# Patient Record
Sex: MALE | Race: BLACK OR AFRICAN AMERICAN | Employment: OTHER | URBAN - METROPOLITAN AREA
[De-identification: names, ages, dates, MRNs, and addresses within clinical notes are randomized per-mention and may not be internally consistent; named-entity substitution may affect disease eponyms.]

---

## 2017-01-15 DIAGNOSIS — I10 ESSENTIAL HYPERTENSION WITH GOAL BLOOD PRESSURE LESS THAN 130/80: ICD-10-CM

## 2017-01-16 ENCOUNTER — HOSPITAL ENCOUNTER (OUTPATIENT)
Dept: CARDIAC REHAB | Age: 57
Discharge: HOME | End: 2017-01-16
Payer: COMMERCIAL

## 2017-01-16 VITALS — WEIGHT: 255 LBS | BODY MASS INDEX: 33.64 KG/M2

## 2017-01-16 PROCEDURE — 93798 PHYS/QHP OP CAR RHAB W/ECG: CPT

## 2017-01-16 RX ORDER — HYDROCHLOROTHIAZIDE 25 MG/1
TABLET ORAL
Qty: 30 TAB | Refills: 3 | Status: SHIPPED | OUTPATIENT
Start: 2017-01-16 | End: 2017-05-22 | Stop reason: SDUPTHER

## 2017-01-23 ENCOUNTER — HOSPITAL ENCOUNTER (OUTPATIENT)
Dept: CARDIAC REHAB | Age: 57
Discharge: HOME | End: 2017-01-23
Payer: COMMERCIAL

## 2017-01-23 VITALS — BODY MASS INDEX: 33.51 KG/M2 | WEIGHT: 254 LBS

## 2017-01-23 PROCEDURE — 93798 PHYS/QHP OP CAR RHAB W/ECG: CPT

## 2017-01-24 ENCOUNTER — HOSPITAL ENCOUNTER (OUTPATIENT)
Dept: CARDIAC REHAB | Age: 57
Discharge: HOME | End: 2017-01-24
Payer: COMMERCIAL

## 2017-01-24 VITALS — WEIGHT: 254 LBS | BODY MASS INDEX: 33.51 KG/M2

## 2017-01-24 PROCEDURE — 93798 PHYS/QHP OP CAR RHAB W/ECG: CPT

## 2017-02-01 DIAGNOSIS — I10 ESSENTIAL HYPERTENSION WITH GOAL BLOOD PRESSURE LESS THAN 130/80: ICD-10-CM

## 2017-02-01 RX ORDER — ENALAPRIL MALEATE 20 MG/1
TABLET ORAL
Qty: 60 TAB | Refills: 3 | Status: SHIPPED | OUTPATIENT
Start: 2017-02-01 | End: 2017-06-20 | Stop reason: SDUPTHER

## 2017-02-16 ENCOUNTER — TELEPHONE (OUTPATIENT)
Dept: CARDIAC REHAB | Age: 57
End: 2017-02-16

## 2017-02-16 NOTE — TELEPHONE ENCOUNTER
2/16/2017 Cardiac Wellness: Follow up phone call placed to Mr. Gem Odom following several weeks absence. Mr. Maximo Nolasco stated he has been ill, but will be returning to cardiac rehab on 2/20/2017.  Amanda Farmer RN

## 2017-02-20 ENCOUNTER — HOSPITAL ENCOUNTER (OUTPATIENT)
Dept: CARDIAC REHAB | Age: 57
Discharge: HOME | End: 2017-02-20

## 2017-02-20 VITALS — WEIGHT: 250 LBS | BODY MASS INDEX: 32.98 KG/M2

## 2017-02-21 ENCOUNTER — APPOINTMENT (OUTPATIENT)
Dept: CARDIAC REHAB | Age: 57
End: 2017-02-21

## 2017-02-22 ENCOUNTER — HOSPITAL ENCOUNTER (OUTPATIENT)
Dept: CARDIAC REHAB | Age: 57
Discharge: HOME | End: 2017-02-22

## 2017-02-22 VITALS — WEIGHT: 250 LBS | BODY MASS INDEX: 32.98 KG/M2

## 2017-02-27 ENCOUNTER — HOSPITAL ENCOUNTER (OUTPATIENT)
Dept: CARDIAC REHAB | Age: 57
Discharge: HOME | End: 2017-02-27

## 2017-02-27 VITALS — BODY MASS INDEX: 32.98 KG/M2 | WEIGHT: 250 LBS

## 2017-03-02 ENCOUNTER — HOSPITAL ENCOUNTER (OUTPATIENT)
Dept: CARDIAC REHAB | Age: 57
Discharge: HOME | End: 2017-03-02
Payer: COMMERCIAL

## 2017-03-02 VITALS — WEIGHT: 250 LBS | BODY MASS INDEX: 32.98 KG/M2

## 2017-03-20 ENCOUNTER — HOSPITAL ENCOUNTER (OUTPATIENT)
Dept: CARDIAC REHAB | Age: 57
Discharge: HOME | End: 2017-03-20
Payer: COMMERCIAL

## 2017-03-20 VITALS — BODY MASS INDEX: 32.98 KG/M2 | WEIGHT: 250 LBS

## 2017-03-21 ENCOUNTER — HOSPITAL ENCOUNTER (OUTPATIENT)
Dept: CARDIAC REHAB | Age: 57
Discharge: HOME | End: 2017-03-21
Payer: COMMERCIAL

## 2017-03-21 VITALS — WEIGHT: 250 LBS | BODY MASS INDEX: 32.98 KG/M2

## 2017-03-22 ENCOUNTER — HOSPITAL ENCOUNTER (OUTPATIENT)
Dept: CARDIAC REHAB | Age: 57
Discharge: HOME | End: 2017-03-22
Payer: COMMERCIAL

## 2017-03-22 VITALS — WEIGHT: 250 LBS | BODY MASS INDEX: 32.98 KG/M2

## 2017-03-22 PROCEDURE — 93798 PHYS/QHP OP CAR RHAB W/ECG: CPT

## 2017-03-27 ENCOUNTER — APPOINTMENT (OUTPATIENT)
Dept: CARDIAC REHAB | Age: 57
End: 2017-03-27
Payer: COMMERCIAL

## 2017-05-02 ENCOUNTER — OFFICE VISIT (OUTPATIENT)
Dept: CARDIOLOGY CLINIC | Age: 57
End: 2017-05-02

## 2017-05-02 VITALS
RESPIRATION RATE: 16 BRPM | HEIGHT: 73 IN | BODY MASS INDEX: 33.53 KG/M2 | OXYGEN SATURATION: 98 % | WEIGHT: 253 LBS | SYSTOLIC BLOOD PRESSURE: 120 MMHG | DIASTOLIC BLOOD PRESSURE: 78 MMHG | HEART RATE: 68 BPM

## 2017-05-02 DIAGNOSIS — I10 ESSENTIAL HYPERTENSION: ICD-10-CM

## 2017-05-02 DIAGNOSIS — E78.5 HYPERLIPIDEMIA, UNSPECIFIED HYPERLIPIDEMIA TYPE: ICD-10-CM

## 2017-05-02 DIAGNOSIS — Z95.5 PRESENCE OF BARE METAL STENT IN LAD CORONARY ARTERY: ICD-10-CM

## 2017-05-02 DIAGNOSIS — I25.118 CORONARY ARTERY DISEASE OF NATIVE ARTERY OF NATIVE HEART WITH STABLE ANGINA PECTORIS (HCC): Primary | ICD-10-CM

## 2017-05-02 NOTE — MR AVS SNAPSHOT
Visit Information Date & Time Provider Department Dept. Phone Encounter #  
 5/2/2017  8:40 AM Jigar Clements MD [facility] [phone] [id number] Upcoming Health Maintenance Date Due Hepatitis C Screening 1960 Pneumococcal 19-64 Medium Risk (1 of 1 - PPSV23) 9/17/1981 FOBT Q 1 YEAR AGE 50-75 9/17/2012 INFLUENZA AGE 9 TO ADULT 8/1/2017 DTaP/Tdap/Td series (2 - Td) 1/13/2022 Allergies as of 5/2/2017  Review Complete On: 5/2/2017 By: Raymundo Odell LPN Severity Noted Reaction Type Reactions Nifedipine  01/13/2012   Side Effect Other (comments) Caused terrible feeling/not himself/drowsey Quinine  04/17/2009    Itching Current Immunizations  Reviewed on 5/29/2014 Name Date TDAP Vaccine 1/13/2012 Not reviewed this visit Vitals BP Pulse Resp Height(growth percentile) Weight(growth percentile) SpO2  
 120/78 (BP 1 Location: Left arm, BP Patient Position: Sitting) 68 16 6' 1\" (1.854 m) 253 lb (114.8 kg) 98% BMI Smoking Status 33.38 kg/m2 Light Tobacco Smoker Vitals History BMI and BSA Data Body Mass Index Body Surface Area  
 33.38 kg/m 2 2.43 m 2 Preferred Pharmacy Pharmacy Name Phone [id number] [place], [address] [address] Your Updated Medication List  
  
   
This list is accurate as of: 5/2/17  8:53 AM.  Always use your most recent med list.  
  
  
  
  
 ALEVE 220 mg tablet Generic drug:  naproxen sodium Take 220 mg by mouth as needed. amLODIPine 5 mg tablet Commonly known as:  Prema Clancy Take 1 Tab by mouth daily. aspirin delayed-release 81 mg tablet Take  by mouth daily. atorvastatin 20 mg tablet Commonly known as:  LIPITOR Take 1 Tab by mouth daily. carvedilol 6.25 mg tablet Commonly known as:  Giuseppe Clos  
 Take 1 Tab by mouth two (2) times daily (with meals). enalapril 20 mg tablet Commonly known as:  VASOTEC  
TAKE 1 TABLET BY MOUTH TWICE A DAY  
  
 hydroCHLOROthiazide 25 mg tablet Commonly known as:  HYDRODIURIL  
TAKE 1 TABLET BY MOUTH EVERY DAY  
  
 tadalafil 10 mg tablet Commonly known as:  CIALIS  
TAKE 1 TABLET BY MOUTH EVERY DAY AS NEEDED Introducing [facility]! Dear Luzma Kumar: Thank you for requesting a [employer] account. Our records indicate that you already have an active [employer] account. You can access your account anytime at [email or web address] Did you know that you can access your hospital and ER discharge instructions at any time in [employer]? You can also review all of your test results from your hospital stay or ER visit. Additional Information If you have questions, please visit the Frequently Asked Questions section of the [employer] website at [email or web address] Remember, [employer] is NOT to be used for urgent needs. For medical emergencies, dial 911. Now available from your iPhone and Android! Please provide this summary of care documentation to your next provider. Your primary care clinician is listed as Jhony Tang. If you have any questions after today's visit, please call [phone].

## 2017-05-02 NOTE — PROGRESS NOTES
HISTORY OF PRESENT ILLNESS  Abbi Yañez is a 49 y.o. male. He has coronary disease and had a 4 mm bare-metal stent placed in his proximal left anterior descending six months ago. He has lost about thirty-five pounds through diet and exercise. He had chest pain prior to the procedure, but he does not have any chest pain at all now despite walking miles at a time. His cholesterol has been well controlled on a statin medication. He needs to see his primary care physician in the near future. He had some 50-60% narrowings in his other arteries and this concerns him. HPI  Patient Active Problem List   Diagnosis Code    HTN (hypertension) I10    ED (erectile dysfunction) N52.9    Hyperlipemia E78.5    OA (osteoarthritis) of knee M17.10    Prediabetes R73.03    ARYA (obstructive sleep apnea) G47.33    Abnormal stress test R94.39    Coronary artery disease of native artery of native heart with stable angina pectoris (HCC) I25.118    Presence of bare metal stent in LAD coronary artery Z95.5     Current Outpatient Prescriptions   Medication Sig Dispense Refill    enalapril (VASOTEC) 20 mg tablet TAKE 1 TABLET BY MOUTH TWICE A DAY 60 Tab 3    hydroCHLOROthiazide (HYDRODIURIL) 25 mg tablet TAKE 1 TABLET BY MOUTH EVERY DAY 30 Tab 3    amLODIPine (NORVASC) 5 mg tablet Take 1 Tab by mouth daily. 30 Tab 11    atorvastatin (LIPITOR) 20 mg tablet Take 1 Tab by mouth daily. 30 Tab 11    carvedilol (COREG) 6.25 mg tablet Take 1 Tab by mouth two (2) times daily (with meals). 60 Tab 11    naproxen sodium (ALEVE) 220 mg tablet Take 220 mg by mouth as needed. 60 Tab 0    aspirin delayed-release 81 mg tablet Take  by mouth daily.       tadalafil (CIALIS) 10 mg tablet TAKE 1 TABLET BY MOUTH EVERY DAY AS NEEDED 6 Tab 6     Past Medical History:   Diagnosis Date    Arthritis     mainly knees, off and on    Coronary artery disease of native artery of native heart with stable angina pectoris ([facility] 9/13/2016    DM (diabetes mellitus) ([facility] [address] [facility] 12/10/2012    pre-diabetes    ED (erectile dysfunction) 4/17/2009    HTN 4/17/2009    Hypercholesterolemia     Hyperlipemia 4/17/2009     Past Surgical History:   Procedure Laterality Date    CARDIAC SURG PROCEDURE UNLIST  09/22/2016    1 stent    HX CORONARY STENT PLACEMENT      HX HEART CATHETERIZATION      HX PTCA       '  Review of Systems   Constitutional: Negative. HENT: Negative. Eyes: Negative. Respiratory: Negative. Cardiovascular: Negative. Gastrointestinal: Negative. Musculoskeletal: Negative. Skin: Negative. Neurological: Negative. Endo/Heme/Allergies: Negative. Visit Vitals    /78 (BP 1 Location: Left arm, BP Patient Position: Sitting)    Pulse 68    Resp 16    Ht 6' 1\" (1.854 m)    Wt 253 lb (114.8 kg)    SpO2 98%    BMI 33.38 kg/m2       Physical Exam   Constitutional: He is oriented to person, place, and time. He appears well-nourished. HENT:   Head: Atraumatic. Eyes: Conjunctivae are normal.   Neck: Neck supple. Cardiovascular: Normal rate, regular rhythm and normal heart sounds. Exam reveals no gallop and no friction rub. No murmur heard. Pulmonary/Chest: Breath sounds normal. He has no wheezes. Abdominal: Bowel sounds are normal.   Musculoskeletal: He exhibits no edema. Neurological: He is oriented to person, place, and time. Skin: Skin is dry. Nursing note and vitals reviewed. ASSESSMENT and PLAN  He is doing well. I will continue this regimen and see him in six months. I told him today that I am not inclined to do a stress test because it may cause a false positive result. However, if he does have any recurrence of chest pains, he can call sooner and we will do it in the office.

## 2017-05-11 NOTE — CARDIO/PULMONARY
Fracisco Paul  Completed phase II cardiac rehab and attended 36 exercise sessions from 9/17/2016-3/22/2017. Em Braga is interested in maintaining optimal health and will work with Dr. Best [facility] MD.  Em Braga has improved his endurance and stamina through regular exercise, lost 24 lbs and 2.5 inches from his waist. Blood pressure is 122/80 and is WNL. He has also improved his nutrition, [employer], and depression scores, and these were reviewed with patient. Em Braga plans to continue exercising at home by using weights and walking for 45-60 minutes, 3-5 times per week.   [address], RN  5/11/2017

## 2017-10-04 DIAGNOSIS — I10 ESSENTIAL HYPERTENSION WITH GOAL BLOOD PRESSURE LESS THAN 130/80: ICD-10-CM

## 2017-10-04 RX ORDER — AMLODIPINE BESYLATE 5 MG/1
5 TABLET ORAL DAILY
Qty: 30 TAB | Refills: 11 | Status: SHIPPED | OUTPATIENT
Start: 2017-10-04 | End: 2017-12-27 | Stop reason: SDUPTHER

## 2017-10-11 DIAGNOSIS — E78.5 HYPERLIPIDEMIA, UNSPECIFIED HYPERLIPIDEMIA TYPE: Primary | ICD-10-CM

## 2017-10-11 RX ORDER — ATORVASTATIN CALCIUM 20 MG/1
20 TABLET, FILM COATED ORAL DAILY
Qty: 30 TAB | Refills: 11 | Status: SHIPPED | OUTPATIENT
Start: 2017-10-11 | End: 2017-10-12 | Stop reason: SDUPTHER

## 2017-10-11 NOTE — TELEPHONE ENCOUNTER
Requested Prescriptions     Signed Prescriptions Disp Refills    atorvastatin (LIPITOR) 20 mg tablet 30 Tab 11     Sig: Take 1 Tab by mouth daily.      Authorizing Provider: Nasrin Rivera     Ordering User: Makenna Feliciano    Per Dr. Berdie Cogan verbal order

## 2017-10-12 DIAGNOSIS — E78.5 HYPERLIPIDEMIA, UNSPECIFIED HYPERLIPIDEMIA TYPE: ICD-10-CM

## 2017-10-12 RX ORDER — ATORVASTATIN CALCIUM 20 MG/1
20 TABLET, FILM COATED ORAL DAILY
Qty: 30 TAB | Refills: 4 | Status: SHIPPED | OUTPATIENT
Start: 2017-10-12 | End: 2018-06-14 | Stop reason: SDUPTHER

## 2017-11-13 RX ORDER — CARVEDILOL 6.25 MG/1
6.25 TABLET ORAL 2 TIMES DAILY WITH MEALS
Qty: 60 TAB | Refills: 3 | Status: SHIPPED | OUTPATIENT
Start: 2017-11-13 | End: 2018-06-14 | Stop reason: SDUPTHER

## 2017-11-20 DIAGNOSIS — I10 ESSENTIAL HYPERTENSION WITH GOAL BLOOD PRESSURE LESS THAN 130/80: ICD-10-CM

## 2017-11-20 RX ORDER — ENALAPRIL MALEATE 20 MG/1
TABLET ORAL
Qty: 60 TAB | Refills: 3 | OUTPATIENT
Start: 2017-11-20

## 2017-11-20 RX ORDER — HYDROCHLOROTHIAZIDE 25 MG/1
TABLET ORAL
Qty: 30 TAB | Refills: 0 | OUTPATIENT
Start: 2017-11-20

## 2017-11-20 NOTE — TELEPHONE ENCOUNTER
Reached out to patient advising medication evaluation appointment is required before any refills will be authorized.  Patient stated he understood and scheduled an appointment for 11/29 @ 9:45

## 2017-11-29 ENCOUNTER — OFFICE VISIT (OUTPATIENT)
Dept: INTERNAL MEDICINE CLINIC | Age: 57
End: 2017-11-29

## 2017-11-29 VITALS
HEIGHT: 73 IN | SYSTOLIC BLOOD PRESSURE: 132 MMHG | TEMPERATURE: 98.8 F | BODY MASS INDEX: 32.26 KG/M2 | WEIGHT: 243.38 LBS | RESPIRATION RATE: 18 BRPM | OXYGEN SATURATION: 99 % | HEART RATE: 62 BPM | DIASTOLIC BLOOD PRESSURE: 87 MMHG

## 2017-11-29 DIAGNOSIS — E78.00 PURE HYPERCHOLESTEROLEMIA: ICD-10-CM

## 2017-11-29 DIAGNOSIS — R73.9 ELEVATED BLOOD SUGAR: ICD-10-CM

## 2017-11-29 DIAGNOSIS — I25.118 CORONARY ARTERY DISEASE OF NATIVE ARTERY OF NATIVE HEART WITH STABLE ANGINA PECTORIS (HCC): ICD-10-CM

## 2017-11-29 DIAGNOSIS — Z12.5 PROSTATE CANCER SCREENING: ICD-10-CM

## 2017-11-29 DIAGNOSIS — I10 ESSENTIAL HYPERTENSION: Primary | ICD-10-CM

## 2017-11-29 LAB — HBA1C MFR BLD HPLC: 5.5 %

## 2017-11-29 RX ORDER — HYDROCHLOROTHIAZIDE 25 MG/1
TABLET ORAL
Qty: 30 TAB | Refills: 5 | Status: SHIPPED | OUTPATIENT
Start: 2017-11-29 | End: 2018-06-05 | Stop reason: SDUPTHER

## 2017-11-29 NOTE — PROGRESS NOTES
HISTORY OF PRESENT ILLNESS  Matthew Devries is a 56 y.o. male. HPI   Lost to followup over 1 year. Prediabetes:  Matthew Devries is here for follow up of elevated fasting sugars and prediabetes. he has been counseled before on low carb/ low concentrated sweets diet and is following that plan. further diabetic ROS: no polyuria or polydipsia, no chest pain, dyspnea or TIAs . Lab Results   Component Value Date/Time    Glucose 108 10/24/2016 09:54 AM     Lab Results   Component Value Date/Time    Hemoglobin A1c 6.4 05/29/2014 09:44 AM    Hemoglobin A1c (POC) 5.5 11/29/2017 10:00 AM     Last Point of Care HGB A1C  Hemoglobin A1c (POC)   Date Value Ref Range Status   11/29/2017 5.5 % Final        Hypertension:  Matthew Devries is a 56 y.o. male with hypertension. without Chronic kidney disease stage    Medication change since last visit: Yes: Comment: on coreg now  The patient reports:  taking medications as instructed, no medication side effects noted, home BP monitoring in range of 937'B systolic over 22-04'W diastolic, no chest pain on exertion, no dyspnea on exertion, no swelling of ankles, no orthostatic dizziness or lightheadedness, no palpitations. Lifestyle modification/social history: generally follows a low fat low cholesterol diet, generally follows a low sodium diet, exercises sporadically, nonsmoker    Lab Results   Component Value Date/Time    Sodium 140 10/24/2016 09:54 AM    Potassium 4.1 10/24/2016 09:54 AM    Chloride 99 10/24/2016 09:54 AM    CO2 27 10/24/2016 09:54 AM    Anion gap 11 06/21/2010 11:06 AM    Glucose 108 10/24/2016 09:54 AM    BUN 16 10/24/2016 09:54 AM    Creatinine 1.07 10/24/2016 09:54 AM    BUN/Creatinine ratio 15 10/24/2016 09:54 AM    GFR est AA 90 10/24/2016 09:54 AM    GFR est non-AA 78 10/24/2016 09:54 AM    Calcium 9.9 10/24/2016 09:54 AM         Hyperlipidemia:  Matthew Devries is following up on his dyslipidemia.     Cardiovascular risks for him are: LDL goal is under [de-identified]  existing CAD  hypertension  obese. Currently he takes Lipitor (atorvastatin) ,   Lab Results   Component Value Date/Time    Cholesterol, total 117 10/24/2016 09:54 AM    Cholesterol, total 214 08/31/2016 09:52 AM    Cholesterol, total 213 06/03/2015 10:24 AM    Cholesterol, total 211 05/29/2014 09:44 AM    Cholesterol, total 232 11/22/2013 08:32 AM    HDL Cholesterol 45 10/24/2016 09:54 AM    HDL Cholesterol 53 08/31/2016 09:52 AM    HDL Cholesterol 51 06/03/2015 10:24 AM    HDL Cholesterol 53 05/29/2014 09:44 AM    HDL Cholesterol 65 11/22/2013 08:32 AM    LDL, calculated 60 10/24/2016 09:54 AM    LDL, calculated 146 08/31/2016 09:52 AM    LDL, calculated 137 06/03/2015 10:24 AM    LDL, calculated 139 05/29/2014 09:44 AM    LDL, calculated 150 11/22/2013 08:32 AM    Triglyceride 60 10/24/2016 09:54 AM    Triglyceride 76 08/31/2016 09:52 AM    Triglyceride 124 06/03/2015 10:24 AM    Triglyceride 93 05/29/2014 09:44 AM    Triglyceride 87 11/22/2013 08:32 AM    CHOL/HDL Ratio 3.7 06/21/2010 11:06 AM     Lab Results   Component Value Date/Time    ALT (SGPT) 17 10/24/2016 09:54 AM    AST (SGOT) 16 10/24/2016 09:54 AM    Alk. phosphatase 75 10/24/2016 09:54 AM    Bilirubin, total 1.3 10/24/2016 09:54 AM       Myalgias: no  Fatigue: no        Hx of single vessel CAD with stenting by Dr. Tristin Sahu. No recurrent chest pain. No congestive heart failure or AMI documented.     Patient Active Problem List   Diagnosis Code    HTN (hypertension) I10    ED (erectile dysfunction) N52.9    Hyperlipemia E78.5    OA (osteoarthritis) of knee M17.10    Prediabetes R73.03    ARYA (obstructive sleep apnea) G47.33    Abnormal stress test R94.39    Coronary artery disease of native artery of native heart with stable angina pectoris (HCC) I25.118    Presence of bare metal stent in LAD coronary artery Z95.5     Past Medical History:   Diagnosis Date    Arthritis     mainly knees, off and on    Coronary artery disease of native artery of native heart with stable angina pectoris ([facility] 9/13/2016    1 stent - RCA - Dr. Leonardo Collado    DM (diabetes mellitus) ([facility] 12/10/2012    pre-diabetes    ED (erectile dysfunction) 4/17/2009    HTN 4/17/2009    Hypercholesterolemia     Hyperlipemia 4/17/2009     Allergies   Allergen Reactions    Nifedipine Other (comments)     Caused terrible feeling/not himself/drowsey     Quinine Itching     Current Outpatient Prescriptions on File Prior to Visit   Medication Sig Dispense Refill    carvedilol (COREG) 6.25 mg tablet Take 1 Tab by mouth two (2) times daily (with meals). 60 Tab 3    atorvastatin (LIPITOR) 20 mg tablet Take 1 Tab by mouth daily. 30 Tab 4    amLODIPine (NORVASC) 5 mg tablet Take 1 Tab by mouth daily. 30 Tab 11    enalapril (VASOTEC) 20 mg tablet TAKE 1 TABLET BY MOUTH TWICE A DAY 60 Tab 3    tadalafil (CIALIS) 10 mg tablet TAKE 1 TABLET BY MOUTH EVERY DAY AS NEEDED 6 Tab 6    naproxen sodium (ALEVE) 220 mg tablet Take 220 mg by mouth as needed. 60 Tab 0    aspirin delayed-release 81 mg tablet Take  by mouth daily. No current facility-administered medications on file prior to visit. Social History   Substance Use Topics    Smoking status: Light Tobacco Smoker     Types: Cigars     Last attempt to quit: 4/27/2004    Smokeless tobacco: Never Used      Comment: 2-3 times a year    Alcohol use 0.5 oz/week     1 Cans of beer per week           ROS    Physical Exam   Constitutional: He appears well-developed and well-nourished. No distress. /87 (BP 1 Location: Left arm, BP Patient Position: Sitting)  Pulse 62  Temp 98.8 °F (37.1 °C) (Oral)   Resp 18  Ht 6' 1\" (1.854 m)  Wt 243 lb 6 oz (110.4 kg)  SpO2 99%  BMI 32.11 kg/m2Body mass index is 32.11 kg/(m^2). HENT:   Mouth/Throat: Oropharynx is clear and moist.   Neck: No JVD present. Carotid bruit is not present.    Cardiovascular: Normal rate, regular rhythm, normal heart sounds and intact distal pulses. Pulmonary/Chest: Effort normal and breath sounds normal.   Musculoskeletal: He exhibits no edema. Neurological: He is alert. Skin: Skin is warm and dry. He is not diaphoretic. Nursing note and vitals reviewed. ASSESSMENT and PLAN  Diagnoses and all orders for this visit:    1. Essential hypertension - Well controlled and stable. his medications were reviewed and refilled where necessary as noted below. Labs ordered as noted. -     METABOLIC PANEL, COMPREHENSIVE  -     hydroCHLOROthiazide (HYDRODIURIL) 25 mg tablet; TAKE 1 TABLET BY MOUTH EVERY DAY    2. Elevated blood sugar - resolved. Good low carb diet, continue wt loss plans  -     AMB POC HEMOGLOBIN A1C    3. Coronary artery disease of native artery of native heart with stable angina pectoris ([facility] [address] - no further ischemic symptoms. Good RF control, ASA  4. Pure hypercholesterolemia - Well controlled and stable. his medications were reviewed and refilled where necessary as noted below. Labs ordered as noted. -     LIPID PANEL    5. Prostate cancer screening  -     PROSTATE SPECIFIC AG      Follow-up Disposition:  Return in about 3 months (around 2/28/2018).   CPE then

## 2017-11-29 NOTE — MR AVS SNAPSHOT
Visit Information Date & Time Provider Department Dept. Phone Encounter #  
 11/29/2017  9:45 AM Tru Lebron MD [facility] [address] [place] [id number] Follow-up Instructions Return in about 3 months (around 2/28/2018). Upcoming Health Maintenance Date Due Hepatitis C Screening 1960 Pneumococcal 19-64 Medium Risk (1 of 1 - PPSV23) 9/17/1981 FOBT Q 1 YEAR AGE 50-75 9/17/2012 Influenza Age 5 to Adult 8/1/2017 DTaP/Tdap/Td series (2 - Td) 1/13/2022 Allergies as of 11/29/2017  Review Complete On: 5/2/2017 By: López Perkins MD  
  
 Severity Noted Reaction Type Reactions Nifedipine  01/13/2012   Side Effect Other (comments) Caused terrible feeling/not himself/drowsey Quinine  04/17/2009    Itching Current Immunizations  Reviewed on 5/29/2014 Name Date TDAP Vaccine 1/13/2012 Not reviewed this visit You Were Diagnosed With   
  
 Codes Comments Essential hypertension    -  Primary ICD-10-CM: I10 
ICD-9-CM: 401.9 Elevated blood sugar     ICD-10-CM: R73.9 ICD-9-CM: 790.29 Coronary artery disease of native artery of native heart with stable angina pectoris ([facility] [address] [facility]     ICD-10-CM: I25.118 
ICD-9-CM: 414.01, 413.9 Pure hypercholesterolemia     ICD-10-CM: E78.00 ICD-9-CM: 272.0 Prostate cancer screening     ICD-10-CM: Z12.5 ICD-9-CM: V76.44 Essential hypertension with goal blood pressure less than 130/80     ICD-10-CM: I10 
ICD-9-CM: 401.9 Vitals BP Pulse Temp Resp Height(growth percentile) Weight(growth percentile) 132/87 (BP 1 Location: Left arm, BP Patient Position: Sitting) 62 98.8 °F (37.1 °C) (Oral) 18 6' 1\" (1.854 m) 243 lb 6 oz (110.4 kg) SpO2 BMI Smoking Status 99% 32.11 kg/m2 Light Tobacco Smoker Vitals History BMI and BSA Data Body Mass Index Body Surface Area  
 32.11 kg/m 2 2.38 m 2 Preferred Pharmacy Pharmacy Name Phone [id number] [facility], [address] [place] [address] [phone] Your Updated Medication List  
  
   
This list is accurate as of: 11/29/17 10:26 AM.  Always use your most recent med list.  
  
  
  
  
 ALEVE 220 mg tablet Generic drug:  naproxen sodium Take 220 mg by mouth as needed. amLODIPine 5 mg tablet Commonly known as:  Meeta Fraction Take 1 Tab by mouth daily. aspirin delayed-release 81 mg tablet Take  by mouth daily. atorvastatin 20 mg tablet Commonly known as:  LIPITOR Take 1 Tab by mouth daily. carvedilol 6.25 mg tablet Commonly known as:  Juliette Spry Take 1 Tab by mouth two (2) times daily (with meals). enalapril 20 mg tablet Commonly known as:  VASOTEC  
TAKE 1 TABLET BY MOUTH TWICE A DAY  
  
 hydroCHLOROthiazide 25 mg tablet Commonly known as:  HYDRODIURIL  
TAKE 1 TABLET BY MOUTH EVERY DAY  
  
 tadalafil 10 mg tablet Commonly known as:  CIALIS  
TAKE 1 TABLET BY MOUTH EVERY DAY AS NEEDED Prescriptions Sent to Pharmacy Refills  
 hydroCHLOROthiazide (HYDRODIURIL) 25 mg tablet 5 Sig: TAKE 1 TABLET BY MOUTH EVERY DAY Class: Normal  
 Pharmacy: [id number], [address] [place] [address] Ph #: [phone] We Performed the Following AMB POC HEMOGLOBIN A1C [83504 CPT(R)] LIPID PANEL [48948 CPT(R)] METABOLIC PANEL, COMPREHENSIVE [06614 CPT(R)] PSA, DIAGNOSTIC (PROSTATE SPECIFIC AG) [id number] CPT(R)] Follow-up Instructions Return in about 3 months (around 2/28/2018). Introducing [address]! Dear Dolores Celaya: Thank you for requesting a [employer] account. Our records indicate that you already have an active [employer] account. You can access your account anytime at [email or web address] Did you know that you can access your hospital and ER discharge instructions at any time in [employer]? You can also review all of your test results from your hospital stay or ER visit. Additional Information If you have questions, please visit the Frequently Asked Questions section of the [employer] website at [email or web address] Remember, [employer] is NOT to be used for urgent needs. For medical emergencies, dial 911. Now available from your iPhone and Android! Please provide this summary of care documentation to your next provider. Your primary care clinician is listed as Hira Shine. If you have any questions after today's visit, please call [phone].

## 2017-11-30 LAB
ALBUMIN SERPL-MCNC: 4.5 G/DL (ref 3.5–5.5)
ALBUMIN/GLOB SERPL: 1.8 {RATIO} (ref 1.2–2.2)
ALP SERPL-CCNC: 60 IU/L (ref 39–117)
ALT SERPL-CCNC: 12 IU/L (ref 0–44)
AST SERPL-CCNC: 15 IU/L (ref 0–40)
BILIRUB SERPL-MCNC: 1.4 MG/DL (ref 0–1.2)
BUN SERPL-MCNC: 15 MG/DL (ref 6–24)
BUN/CREAT SERPL: 17 (ref 9–20)
CALCIUM SERPL-MCNC: 9.2 MG/DL (ref 8.7–10.2)
CHLORIDE SERPL-SCNC: 100 MMOL/L (ref 96–106)
CHOLEST SERPL-MCNC: 144 MG/DL (ref 100–199)
CO2 SERPL-SCNC: 27 MMOL/L (ref 18–29)
CREAT SERPL-MCNC: 0.89 MG/DL (ref 0.76–1.27)
GFR SERPLBLD CREATININE-BSD FMLA CKD-EPI: 111 ML/MIN/1.73
GFR SERPLBLD CREATININE-BSD FMLA CKD-EPI: 96 ML/MIN/1.73
GLOBULIN SER CALC-MCNC: 2.5 G/DL (ref 1.5–4.5)
GLUCOSE SERPL-MCNC: 92 MG/DL (ref 65–99)
HDLC SERPL-MCNC: 60 MG/DL
INTERPRETATION, 910389: NORMAL
LDLC SERPL CALC-MCNC: 72 MG/DL (ref 0–99)
POTASSIUM SERPL-SCNC: 4 MMOL/L (ref 3.5–5.2)
PROT SERPL-MCNC: 7 G/DL (ref 6–8.5)
PSA SERPL-MCNC: 1 NG/ML (ref 0–4)
SODIUM SERPL-SCNC: 143 MMOL/L (ref 134–144)
TRIGL SERPL-MCNC: 62 MG/DL (ref 0–149)
VLDLC SERPL CALC-MCNC: 12 MG/DL (ref 5–40)

## 2017-12-27 DIAGNOSIS — I10 ESSENTIAL HYPERTENSION WITH GOAL BLOOD PRESSURE LESS THAN 130/80: ICD-10-CM

## 2017-12-27 RX ORDER — AMLODIPINE BESYLATE 5 MG/1
5 TABLET ORAL DAILY
Qty: 30 TAB | Refills: 0 | Status: SHIPPED | OUTPATIENT
Start: 2017-12-27 | End: 2017-12-28 | Stop reason: SDUPTHER

## 2017-12-27 NOTE — TELEPHONE ENCOUNTER
Requested Prescriptions     Signed Prescriptions Disp Refills    amLODIPine (NORVASC) 5 mg tablet 30 Tab 0     Sig: Take 1 Tab by mouth daily.      Authorizing Provider: Carisa Herman     Ordering User: Will Acharya    Per Dr. Gloria Winter verbal order     Note to pharmacy: PATIENT NEEDS TO MAKE AN OFFICE APPOINTMENT BEFORE ANY FURTHER REFILLS ARE GIVEN

## 2017-12-28 DIAGNOSIS — I10 ESSENTIAL HYPERTENSION WITH GOAL BLOOD PRESSURE LESS THAN 130/80: ICD-10-CM

## 2017-12-28 RX ORDER — AMLODIPINE BESYLATE 5 MG/1
5 TABLET ORAL DAILY
Qty: 90 TAB | Refills: 0 | Status: SHIPPED | OUTPATIENT
Start: 2017-12-28 | End: 2018-06-14 | Stop reason: SDUPTHER

## 2017-12-28 NOTE — TELEPHONE ENCOUNTER
Requested Prescriptions     Signed Prescriptions Disp Refills    amLODIPine (NORVASC) 5 mg tablet 90 Tab 0     Sig: Take 1 Tab by mouth daily.      Authorizing Provider: Sandy Jauregui     Ordering User: Yordy Cuevas    Per Dr. Vanna Ferrer verbal order

## 2018-05-06 DIAGNOSIS — I10 ESSENTIAL HYPERTENSION WITH GOAL BLOOD PRESSURE LESS THAN 130/80: ICD-10-CM

## 2018-05-06 RX ORDER — ENALAPRIL MALEATE 20 MG/1
TABLET ORAL
Qty: 60 TAB | Refills: 3 | Status: SHIPPED | OUTPATIENT
Start: 2018-05-06 | End: 2018-09-18 | Stop reason: SDUPTHER

## 2018-05-14 DIAGNOSIS — I10 ESSENTIAL HYPERTENSION WITH GOAL BLOOD PRESSURE LESS THAN 130/80: ICD-10-CM

## 2018-05-14 NOTE — TELEPHONE ENCOUNTER
Received request for amlodipine refill. Faxed pharmacy request back with message: PATIENT NEEDS TO MAKE AN OFFICE APPOINTMENT BEFORE ANY FURTHER REFILLS ARE GIVEN or request from PCP.

## 2018-06-05 DIAGNOSIS — I10 ESSENTIAL HYPERTENSION: ICD-10-CM

## 2018-06-05 RX ORDER — HYDROCHLOROTHIAZIDE 25 MG/1
TABLET ORAL
Qty: 30 TAB | Refills: 5 | Status: SHIPPED | OUTPATIENT
Start: 2018-06-05 | End: 2018-09-21 | Stop reason: SDUPTHER

## 2018-06-12 ENCOUNTER — TELEPHONE (OUTPATIENT)
Dept: INTERNAL MEDICINE CLINIC | Age: 58
End: 2018-06-12

## 2018-06-12 NOTE — TELEPHONE ENCOUNTER
Patient wants you to work him for a medication refill soon he is out. Dr Stella Camacho does not have any thing. Patient was getting mad and tell me he needs his [facility] has been with out them for a while.  His no is [phone]

## 2018-06-14 ENCOUNTER — OFFICE VISIT (OUTPATIENT)
Dept: INTERNAL MEDICINE CLINIC | Age: 58
End: 2018-06-14

## 2018-06-14 VITALS
HEART RATE: 66 BPM | HEIGHT: 73 IN | DIASTOLIC BLOOD PRESSURE: 87 MMHG | WEIGHT: 244.38 LBS | BODY MASS INDEX: 32.39 KG/M2 | SYSTOLIC BLOOD PRESSURE: 147 MMHG | RESPIRATION RATE: 18 BRPM | OXYGEN SATURATION: 97 % | TEMPERATURE: 98.9 F

## 2018-06-14 DIAGNOSIS — E78.5 HYPERLIPIDEMIA, UNSPECIFIED HYPERLIPIDEMIA TYPE: ICD-10-CM

## 2018-06-14 DIAGNOSIS — I25.118 CORONARY ARTERY DISEASE OF NATIVE ARTERY OF NATIVE HEART WITH STABLE ANGINA PECTORIS (HCC): ICD-10-CM

## 2018-06-14 DIAGNOSIS — I10 ESSENTIAL HYPERTENSION WITH GOAL BLOOD PRESSURE LESS THAN 130/80: Primary | ICD-10-CM

## 2018-06-14 DIAGNOSIS — R73.9 ELEVATED BLOOD SUGAR: ICD-10-CM

## 2018-06-14 DIAGNOSIS — E66.9 OBESITY (BMI 30-39.9): ICD-10-CM

## 2018-06-14 LAB — HBA1C MFR BLD HPLC: 5.4 %

## 2018-06-14 RX ORDER — CARVEDILOL 6.25 MG/1
6.25 TABLET ORAL 2 TIMES DAILY WITH MEALS
Qty: 180 TAB | Refills: 1 | Status: SHIPPED | OUTPATIENT
Start: 2018-06-14 | End: 2019-02-02 | Stop reason: SDUPTHER

## 2018-06-14 RX ORDER — ATORVASTATIN CALCIUM 20 MG/1
20 TABLET, FILM COATED ORAL DAILY
Qty: 90 TAB | Refills: 1 | Status: SHIPPED | OUTPATIENT
Start: 2018-06-14 | End: 2018-09-24 | Stop reason: SDUPTHER

## 2018-06-14 RX ORDER — AMLODIPINE BESYLATE 5 MG/1
5 TABLET ORAL DAILY
Qty: 90 TAB | Refills: 1 | Status: SHIPPED | OUTPATIENT
Start: 2018-06-14 | End: 2018-12-10 | Stop reason: SDUPTHER

## 2018-06-14 NOTE — PROGRESS NOTES
HISTORY OF PRESENT ILLNESS  Arabella Joseph is a 56 y.o. male. HPI  Hypertension:  Arabella Joseph is a 56 y.o. male with hypertension. without Chronic kidney disease stage    Medication change since last visit: No  The patient reports:  taking medications as instructed, no medication side effects noted, home BP monitoring in range of 119'C systolic over 23'E diastolic, no chest pain on exertion, no dyspnea on exertion, no swelling of ankles, no orthostatic dizziness or lightheadedness, no palpitations. Lifestyle modification/social history: generally follows a low fat low cholesterol diet, generally follows a low sodium diet, exercises regularly, nonsmoker   He has lost about 50lbs      Lab Results   Component Value Date/Time    Sodium 143 11/29/2017 11:00 AM    Potassium 4.0 11/29/2017 11:00 AM    Chloride 100 11/29/2017 11:00 AM    CO2 27 11/29/2017 11:00 AM    Anion gap 11 06/21/2010 11:06 AM    Glucose 92 11/29/2017 11:00 AM    BUN 15 11/29/2017 11:00 AM    Creatinine 0.89 11/29/2017 11:00 AM    BUN/Creatinine ratio 17 11/29/2017 11:00 AM    GFR est  11/29/2017 11:00 AM    GFR est non-AA 96 11/29/2017 11:00 AM    Calcium 9.2 11/29/2017 11:00 AM       Prediabetes:  Arabella Joseph is here for follow up of elevated fasting sugars and prediabetes. he has been counseled before on low carb/ low concentrated sweets diet and is following that plan. further diabetic ROS: no polyuria or polydipsia, no chest pain, dyspnea or TIAs . Lab Results   Component Value Date/Time    Glucose 92 11/29/2017 11:00 AM     Lab Results   Component Value Date/Time    Hemoglobin A1c 6.4 (H) 05/29/2014 09:44 AM    Hemoglobin A1c (POC) 5.4 06/14/2018 02:30 AM     Last Point of Care HGB A1C  Hemoglobin A1c (POC)   Date Value Ref Range Status   06/14/2018 5.4 % Final        Hyperlipidemia:  Arabella Joseph is following up on his dyslipidemia.     Cardiovascular risks for him are: LDL goal is under 80  existing CAD  hypertension. Currently he takes Lipitor (atorvastatin) ,   Lab Results   Component Value Date/Time    Cholesterol, total 144 11/29/2017 11:00 AM    Cholesterol, total 117 10/24/2016 09:54 AM    Cholesterol, total 214 (H) 08/31/2016 09:52 AM    Cholesterol, total 213 (H) 06/03/2015 10:24 AM    Cholesterol, total 211 (H) 05/29/2014 09:44 AM    HDL Cholesterol 60 11/29/2017 11:00 AM    HDL Cholesterol 45 10/24/2016 09:54 AM    HDL Cholesterol 53 08/31/2016 09:52 AM    HDL Cholesterol 51 06/03/2015 10:24 AM    HDL Cholesterol 53 05/29/2014 09:44 AM    LDL, calculated 72 11/29/2017 11:00 AM    LDL, calculated 60 10/24/2016 09:54 AM    LDL, calculated 146 (H) 08/31/2016 09:52 AM    LDL, calculated 137 (H) 06/03/2015 10:24 AM    LDL, calculated 139 (H) 05/29/2014 09:44 AM    Triglyceride 62 11/29/2017 11:00 AM    Triglyceride 60 10/24/2016 09:54 AM    Triglyceride 76 08/31/2016 09:52 AM    Triglyceride 124 06/03/2015 10:24 AM    Triglyceride 93 05/29/2014 09:44 AM    CHOL/HDL Ratio 3.7 06/21/2010 11:06 AM     Lab Results   Component Value Date/Time    ALT (SGPT) 12 11/29/2017 11:00 AM    AST (SGOT) 15 11/29/2017 11:00 AM    Alk. phosphatase 60 11/29/2017 11:00 AM    Bilirubin, total 1.4 (H) 11/29/2017 11:00 AM       Myalgias: no  Fatigue: no          ROS    Physical Exam   Constitutional: He appears well-developed and well-nourished. No distress. HENT:   Mouth/Throat: Oropharynx is clear and moist.   Neck: No JVD present. Carotid bruit is not present. Cardiovascular: Normal rate, regular rhythm, normal heart sounds and intact distal pulses. Pulmonary/Chest: Effort normal and breath sounds normal.   Musculoskeletal: He exhibits no edema. Neurological: He is alert. Skin: Skin is warm and dry. He is not diaphoretic. Nursing note and vitals reviewed.     Visit Vitals    /87 (BP 1 Location: Left arm, BP Patient Position: Sitting)    Pulse 66    Temp 98.9 °F (37.2 °C)    Resp 18    Ht 6' 1\" (1.854 m)    Wt 244 lb 6 oz (110.8 kg)    SpO2 97%    BMI 32.24 kg/m2     ASSESSMENT and PLAN  Diagnoses and all orders for this visit:    1. Essential hypertension with goal blood pressure less than 130/80 - off CCB and now elevated. Resume CCB. Log blood pressures at home while sitting, relaxed 3-5 times weekly and bring to next visit. Pt educated on goal BP of 130/80 on average or lower. Call office as soon as possible if BP's over 140/90 or below 110/50 on multiple occasions and/or with symptoms of dizziness, chest pain, shortness of breath, headache or ankle swelling. Recheck log and bp here in 3 month(s). -     amLODIPine (NORVASC) 5 mg tablet; Take 1 Tab by mouth daily. -     carvedilol (COREG) 6.25 mg tablet; Take 1 Tab by mouth two (2) times daily (with meals). -     METABOLIC PANEL, BASIC    2. Elevated blood sugar -resolved with wt loss. -     AMB POC HEMOGLOBIN A1C    3. Hyperlipidemia, unspecified hyperlipidemia type - Well controlled and stable. his medications were reviewed and refilled where necessary as noted below. Labs ordered as noted. -     atorvastatin (LIPITOR) 20 mg tablet; Take 1 Tab by mouth daily. 4. Coronary artery disease of native artery of native heart with stable angina pectoris ([facility] [address] -no ischemic symptoms. Ongoing aggressive RF control  -     carvedilol (COREG) 6.25 mg tablet; Take 1 Tab by mouth two (2) times daily (with meals). 5. Obesity (BMI 30-39.9) - good wt loss with lifestyle changes. Follow-up Disposition:  Return in about 3 months (around 9/14/2018).

## 2018-06-14 NOTE — MR AVS SNAPSHOT
Aldo Sanchez 
 
 
 [address] 
[phone] Patient: Miriam James MRN: [id number] LED:[birth year] Visit Information Date & Time Provider Department Dept. Phone Encounter #  
 6/14/2018  2:15 PM Fab Roblero MD [facility] [address] [facility] [id number] Follow-up Instructions Return in about 3 months (around 9/14/2018). Upcoming Health Maintenance Date Due Hepatitis C Screening 1960 Pneumococcal 19-64 Medium Risk (1 of 1 - PPSV23) 9/17/1981 FOBT Q 1 YEAR AGE 50-75 9/17/2012 Influenza Age 5 to Adult 8/1/2018 DTaP/Tdap/Td series (2 - Td) 1/13/2022 Allergies as of 6/14/2018  Review Complete On: 5/2/2017 By: Hussein Gutierrez MD  
  
 Severity Noted Reaction Type Reactions Nifedipine  01/13/2012   Side Effect Other (comments) Caused terrible feeling/not himself/drowsey Quinine  04/17/2009    Itching Current Immunizations  Reviewed on 5/29/2014 Name Date TDAP Vaccine 1/13/2012 Not reviewed this visit You Were Diagnosed With   
  
 Codes Comments Elevated blood sugar    -  Primary ICD-10-CM: R73.9 ICD-9-CM: 790.29 Essential hypertension with goal blood pressure less than 130/80     ICD-10-CM: I10 
ICD-9-CM: 401.9 Hyperlipidemia, unspecified hyperlipidemia type     ICD-10-CM: E78.5 ICD-9-CM: 272.4 Vitals Smoking Status Light Tobacco Smoker Preferred Pharmacy Pharmacy Name Phone [id number] Nathan, [address] [facility] [phone] Your Updated Medication List  
  
   
This list is accurate as of 6/14/18  2:59 PM.  Always use your most recent med list.  
  
  
  
  
 ALEVE 220 mg tablet Generic drug:  naproxen sodium Take 220 mg by mouth as needed. amLODIPine 5 mg tablet Commonly known as:  Eva Lavern Take 1 Tab by mouth daily. aspirin delayed-release 81 mg tablet Take  by mouth daily. atorvastatin 20 mg tablet Commonly known as:  LIPITOR Take 1 Tab by mouth daily. carvedilol 6.25 mg tablet Commonly known as:  Raynette Hy Take 1 Tab by mouth two (2) times daily (with meals). enalapril 20 mg tablet Commonly known as:  VASOTEC  
TAKE 1 TABLET BY MOUTH TWICE A DAY  
  
 hydroCHLOROthiazide 25 mg tablet Commonly known as:  HYDRODIURIL  
TAKE 1 TABLET BY MOUTH EVERY DAY  
  
 tadalafil 10 mg tablet Commonly known as:  CIALIS  
TAKE 1 TABLET BY MOUTH EVERY DAY AS NEEDED Prescriptions Sent to Pharmacy Refills  
 amLODIPine (NORVASC) 5 mg tablet 1 Sig: Take 1 Tab by mouth daily. Class: Normal  
 Pharmacy: [facility]/pharmacy #[id number] Ph #: [phone] Route: Oral  
 carvedilol (COREG) 6.25 mg tablet 1 Sig: Take 1 Tab by mouth two (2) times daily (with meals). Class: Normal  
 Pharmacy: [facility]/pharmacy #[id number] Ph #: [phone] Route: Oral  
 atorvastatin (LIPITOR) 20 mg tablet 1 Sig: Take 1 Tab by mouth daily. Class: Normal  
 Pharmacy: [facility]/pharmacy #[id number] Ph #: [phone] Route: Oral  
  
We Performed the Following AMB POC HEMOGLOBIN A1C [93769 CPT(R)] METABOLIC PANEL, BASIC [67065 CPT(R)] Follow-up Instructions Return in about 3 months (around 9/14/2018). Introducing [facility]! Dear Cecilio: Thank you for requesting a [email or web address] account. Our records indicate that you already have an active [email or web address] account. You can access your account anytime at [email or web address] Did you know that you can access your hospital and ER discharge instructions at any time in [email or web address]?   You can also review all of your test results from your hospital stay or ER visit. Additional Information If you have questions, please visit the Frequently Asked Questions section of the [employer] website at [email or web address] Remember, [employer] is NOT to be used for urgent needs. For medical emergencies, dial 911. Now available from your iPhone and Android! Please provide this summary of care documentation to your next provider. Your primary care clinician is listed as Jay Mode. If you have any questions after today's visit, please call [phone].

## 2018-06-15 LAB
BUN SERPL-MCNC: 22 MG/DL (ref 6–24)
BUN/CREAT SERPL: 20 (ref 9–20)
CALCIUM SERPL-MCNC: 9.6 MG/DL (ref 8.7–10.2)
CHLORIDE SERPL-SCNC: 98 MMOL/L (ref 96–106)
CO2 SERPL-SCNC: 25 MMOL/L (ref 20–29)
CREAT SERPL-MCNC: 1.08 MG/DL (ref 0.76–1.27)
GFR SERPLBLD CREATININE-BSD FMLA CKD-EPI: 77 ML/MIN/1.73
GFR SERPLBLD CREATININE-BSD FMLA CKD-EPI: 89 ML/MIN/1.73
GLUCOSE SERPL-MCNC: 83 MG/DL (ref 65–99)
POTASSIUM SERPL-SCNC: 3.9 MMOL/L (ref 3.5–5.2)
SODIUM SERPL-SCNC: 139 MMOL/L (ref 134–144)

## 2018-09-18 DIAGNOSIS — I10 ESSENTIAL HYPERTENSION WITH GOAL BLOOD PRESSURE LESS THAN 130/80: ICD-10-CM

## 2018-09-18 RX ORDER — ENALAPRIL MALEATE 20 MG/1
TABLET ORAL
Qty: 60 TAB | Refills: 3 | Status: SHIPPED | OUTPATIENT
Start: 2018-09-18 | End: 2018-09-21 | Stop reason: SDUPTHER

## 2018-09-21 DIAGNOSIS — I10 ESSENTIAL HYPERTENSION: ICD-10-CM

## 2018-09-21 DIAGNOSIS — I10 ESSENTIAL HYPERTENSION WITH GOAL BLOOD PRESSURE LESS THAN 130/80: ICD-10-CM

## 2018-09-21 RX ORDER — HYDROCHLOROTHIAZIDE 25 MG/1
25 TABLET ORAL DAILY
Qty: 90 TAB | Refills: 0 | Status: SHIPPED | OUTPATIENT
Start: 2018-09-21 | End: 2019-02-03 | Stop reason: SDUPTHER

## 2018-09-21 RX ORDER — ENALAPRIL MALEATE 20 MG/1
20 TABLET ORAL 2 TIMES DAILY
Qty: 180 TAB | Refills: 0 | Status: SHIPPED | OUTPATIENT
Start: 2018-09-21 | End: 2019-02-03 | Stop reason: SDUPTHER

## 2018-09-24 DIAGNOSIS — E78.5 HYPERLIPIDEMIA, UNSPECIFIED HYPERLIPIDEMIA TYPE: ICD-10-CM

## 2018-09-24 RX ORDER — ATORVASTATIN CALCIUM 20 MG/1
20 TABLET, FILM COATED ORAL DAILY
Qty: 30 TAB | Refills: 0 | Status: SHIPPED | OUTPATIENT
Start: 2018-09-24 | End: 2019-03-05 | Stop reason: SDUPTHER

## 2018-09-24 NOTE — TELEPHONE ENCOUNTER
Requested Prescriptions     Signed Prescriptions Disp Refills    atorvastatin (LIPITOR) 20 mg tablet 30 Tab 0     Sig: Take 1 Tab by mouth daily.      Authorizing Provider: Kendrick Higginbotham     Ordering User: Edvin Batista    Per Dr. Charmaine Mahmood verbal order     Note to pharmacy: PATIENT NEEDS TO MAKE AN OFFICE APPOINTMENT BEFORE ANY FURTHER REFILLS ARE GIVEN

## 2019-02-20 ENCOUNTER — OFFICE VISIT (OUTPATIENT)
Dept: FAMILY MEDICINE CLINIC | Age: 59
End: 2019-02-20

## 2019-02-20 VITALS
WEIGHT: 245.25 LBS | HEART RATE: 79 BPM | SYSTOLIC BLOOD PRESSURE: 118 MMHG | BODY MASS INDEX: 32.5 KG/M2 | DIASTOLIC BLOOD PRESSURE: 74 MMHG | RESPIRATION RATE: 18 BRPM | OXYGEN SATURATION: 100 % | HEIGHT: 73 IN | TEMPERATURE: 98.5 F

## 2019-02-20 DIAGNOSIS — R10.32 LEFT LOWER QUADRANT PAIN: Primary | ICD-10-CM

## 2019-02-20 LAB
BACTERIA UA POCT, BACTPOCT: NORMAL
BILIRUB UR QL STRIP: NEGATIVE
CASTS UA POCT: NORMAL
CLUE CELLS, CLUEPOCT: NORMAL
CRYSTALS UA POCT, CRYSPOCT: NORMAL
EPITHELIAL CELLS POCT: NORMAL
GLUCOSE UR-MCNC: NEGATIVE MG/DL
KETONES P FAST UR STRIP-MCNC: NEGATIVE MG/DL
MUCUS UA POCT, MUCPOCT: NORMAL
PH UR STRIP: 6 [PH] (ref 4.6–8)
PROT UR QL STRIP: NEGATIVE
RBC UA POCT, RBCPOCT: NORMAL
SP GR UR STRIP: 1.02 (ref 1–1.03)
TRICH UA POCT, TRICHPOC: NORMAL
UA UROBILINOGEN AMB POC: NORMAL (ref 0.2–1)
URINALYSIS CLARITY POC: CLEAR
URINALYSIS COLOR POC: YELLOW
URINE BLOOD POC: NEGATIVE
URINE CULT COMMENT, POCT: NORMAL
URINE LEUKOCYTES POC: NEGATIVE
URINE NITRITES POC: NEGATIVE
WBC UA POCT, WBCPOCT: NORMAL
YEAST UA POCT, YEASTPOC: NORMAL

## 2019-02-20 NOTE — PROGRESS NOTES
Assessment and Plan    1. Left lower quadrant pain  Uncertain cause. All sx now resolved and normal exam  Normal UA  Discussed options for care and recommend that patient watch for recurrence and see us promptly if that occurs. - AMB POC URINALYSIS DIP STICK AUTO W/ MICRO       Diagnosis and plan discussed with patient who verbillized understanding. Follow-up Disposition:  Return if symptoms recur and as available for physical. . History of present illness:Jadon Stevens is a 66 y.o. male presenting for [facility]    Abdominal pain  Pain started about 930 AM on 2/18  Took aleve. Pain went away 12:30. No pain since. Similar pain in past.  Out of breath exercising in past but that improved with stent  Lost >50 pounds since stent and now no dyspnea on exertion  Worried about this pain after experience with getting stent. Pain in LLQ and radiated to back  No N, V, &D,  No urinary sx: No pain, No blood in urine or stool  No prior abdominal or kidney issues. Testicle pain in childhood that was severe and followed by atrophic right testicle        Review of Systems   Constitutional: Positive for weight loss. Respiratory: Negative for shortness of breath. Cardiovascular: Negative for chest pain. Gastrointestinal: Positive for abdominal pain. Negative for blood in stool, constipation, diarrhea, nausea and vomiting. Genitourinary: Negative for dysuria and hematuria.        All other systems reviewed and are negative for a Comprehensive ROS (10+)    Past Medical History:   Diagnosis Date    Arthritis     mainly knees, off and on    Coronary artery disease of native artery of native heart with stable angina pectoris ([facility] [address] 9/13/2016    1 stent - RCA - Dr. Jr Laguna DM (diabetes mellitus) ([facility] [address] 12/10/2012    pre-diabetes    ED (erectile dysfunction) 4/17/2009    HTN 4/17/2009    Hypercholesterolemia     Hyperlipemia 4/17/2009     Past Surgical History:   Procedure Laterality Date  CARDIAC SURG PROCEDURE UNLIST  2016    1 stent    HX CORONARY STENT PLACEMENT      HX HEART CATHETERIZATION      HX PTCA       Family History   Problem Relation Age of Onset    Heart Disease Mother         premature    Heart Attack Mother     Hypertension Father     Stroke Father     Hypertension Sister     Diabetes Sister     Stroke Sister     No Known Problems Brother     Diabetes Sister     Cancer Neg Hx      Social History     Socioeconomic History    Marital status:      Spouse name: Not on file    Number of children: Not on file    Years of education: Not on file    Highest education level: Not on file   Social Needs    Financial resource strain: Not on file    Food insecurity - worry: Not on file    Food insecurity - inability: Not on file   Tianji needs - medical: Not on file   Tianji needs - non-medical: Not on file   Occupational History    Not on file   Tobacco Use    Smoking status: Light Tobacco Smoker     Types: Cigars     Last attempt to quit: 2004     Years since quittin.8    Smokeless tobacco: Never Used    Tobacco comment: 2-3 times a year   Substance and Sexual Activity    Alcohol use: Yes     Alcohol/week: 0.5 oz     Types: 1 Cans of beer per week    Drug use: No    Sexual activity: Yes     Partners: Female   Other Topics Concern    Not on file   Social History Narrative    Not on file         Current Outpatient Medications   Medication Sig Dispense Refill    carvedilol (COREG) 6.25 mg tablet TAKE 1 TABLET BY MOUTH TWICE A DAY WITH FOOD 60 Tab 0    hydroCHLOROthiazide (HYDRODIURIL) 25 mg tablet TAKE 1 TABLET BY MOUTH DAILY **NEED APPT** 30 Tab 0    enalapril (VASOTEC) 20 mg tablet TAKE 1 TABLET BY MOUTH TWICE A DAY **NEED APPT** 60 Tab 0    amLODIPine (NORVASC) 5 mg tablet TAKE 1 TABLET BY MOUTH EVERY DAY 90 Tab 0    atorvastatin (LIPITOR) 20 mg tablet Take 1 Tab by mouth daily.  30 Tab 0    tadalafil (CIALIS) 10 mg tablet TAKE 1 TABLET BY MOUTH EVERY DAY AS NEEDED 6 Tab 6    naproxen sodium (ALEVE) 220 mg tablet Take 220 mg by mouth as needed. 60 Tab 0    aspirin delayed-release 81 mg tablet Take  by mouth daily. Allergies   Allergen Reactions    Nifedipine Other (comments)     Caused terrible feeling/not himself/drowsey     Quinine Itching       Vitals:    02/20/19 1049   BP: 118/74   Pulse: 79   Resp: 18   Temp: 98.5 °F (36.9 °C)   TempSrc: Oral   SpO2: 100%   Weight: 245 lb 4 oz (111.2 kg)   Height: 6' 1\" (1.854 m)     Body mass index is 32.36 kg/m². Objective  General: Patient alert and oriented and in NAD  Eyes: PER/EOMI, no conjunctival pallor or scleral icterus. Neck: No thyromegaly or cervical lymphadenopathy  Cardiovascular: Heart has regular rate and rhythm, No murmurs, rubs or gallops. No edema  Respiratory: Lungs are clear to auscultation bilaterally, no wheezing, rales or rhonchi, normal chest excursion and no increased work of breathing. Gastorintestinal: abdomen is non-tender, non-distended, without organomegaly or masses  Genitourinary: atrophic right testicle, small left testicle <2 CM. No hernias  Musculoskeletal: All four extremities present and functional.   Skin: No rashes or lesions noted on exposed skin  Neuro: AAOx3, normal gait and speech. No gross neurologic deficits. Psych: Appropriate mood and affect, no homicidal or suicidal ideation, no obsessions, delusions or hallucinations, normal psychomotor status.

## 2019-02-20 NOTE — PROGRESS NOTES
1. Have you been to the ER, urgent care clinic since your last visit? Hospitalized since your last visit? No    2. Have you seen or consulted any other health care providers outside of the [address] [place] [address] since your last visit? Include any pap smears or colon screening.  No

## 2019-03-05 DIAGNOSIS — I10 ESSENTIAL HYPERTENSION: ICD-10-CM

## 2019-03-05 DIAGNOSIS — E78.5 HYPERLIPIDEMIA, UNSPECIFIED HYPERLIPIDEMIA TYPE: ICD-10-CM

## 2019-03-06 RX ORDER — ATORVASTATIN CALCIUM 20 MG/1
20 TABLET, FILM COATED ORAL DAILY
Qty: 90 TAB | Refills: 1 | Status: SHIPPED | OUTPATIENT
Start: 2019-03-06 | End: 2019-09-30 | Stop reason: SDUPTHER

## 2019-03-06 RX ORDER — HYDROCHLOROTHIAZIDE 25 MG/1
25 TABLET ORAL DAILY
Qty: 90 TAB | Refills: 1 | Status: SHIPPED | OUTPATIENT
Start: 2019-03-06 | End: 2019-09-30 | Stop reason: SDUPTHER

## 2019-03-25 ENCOUNTER — APPOINTMENT (OUTPATIENT)
Dept: ULTRASOUND IMAGING | Age: 59
DRG: 446 | End: 2019-03-25
Attending: EMERGENCY MEDICINE
Payer: COMMERCIAL

## 2019-03-25 ENCOUNTER — APPOINTMENT (OUTPATIENT)
Dept: CT IMAGING | Age: 59
DRG: 446 | End: 2019-03-25
Attending: EMERGENCY MEDICINE
Payer: COMMERCIAL

## 2019-03-25 ENCOUNTER — HOSPITAL ENCOUNTER (OUTPATIENT)
Age: 59
Setting detail: OBSERVATION
Discharge: HOME | DRG: 446 | End: 2019-03-27
Attending: EMERGENCY MEDICINE | Admitting: FAMILY MEDICINE
Payer: COMMERCIAL

## 2019-03-25 ENCOUNTER — TELEPHONE (OUTPATIENT)
Dept: FAMILY MEDICINE CLINIC | Age: 59
End: 2019-03-25

## 2019-03-25 DIAGNOSIS — R10.11 ABDOMINAL PAIN, RIGHT UPPER QUADRANT: Primary | ICD-10-CM

## 2019-03-25 DIAGNOSIS — R79.89 ELEVATED LFTS: ICD-10-CM

## 2019-03-25 DIAGNOSIS — K80.20 SYMPTOMATIC CHOLELITHIASIS: ICD-10-CM

## 2019-03-25 PROBLEM — R10.9 ABDOMINAL PAIN: Status: ACTIVE | Noted: 2019-03-25

## 2019-03-25 LAB
ALBUMIN SERPL-MCNC: 4.2 G/DL (ref 3.5–5)
ALBUMIN/GLOB SERPL: 1.1 {RATIO} (ref 1.1–2.2)
ALP SERPL-CCNC: 146 U/L (ref 45–117)
ALT SERPL-CCNC: 391 U/L (ref 12–78)
ANION GAP SERPL CALC-SCNC: 7 MMOL/L (ref 5–15)
AST SERPL-CCNC: 747 U/L (ref 15–37)
BASOPHILS # BLD: 0 K/UL (ref 0–0.1)
BASOPHILS NFR BLD: 0 % (ref 0–1)
BILIRUB SERPL-MCNC: 2.8 MG/DL (ref 0.2–1)
BUN SERPL-MCNC: 17 MG/DL (ref 6–20)
BUN/CREAT SERPL: 16 (ref 12–20)
CALCIUM SERPL-MCNC: 9.1 MG/DL (ref 8.5–10.1)
CHLORIDE SERPL-SCNC: 102 MMOL/L (ref 97–108)
CO2 SERPL-SCNC: 29 MMOL/L (ref 21–32)
CREAT SERPL-MCNC: 1.05 MG/DL (ref 0.7–1.3)
DIFFERENTIAL METHOD BLD: NORMAL
EOSINOPHIL # BLD: 0 K/UL (ref 0–0.4)
EOSINOPHIL NFR BLD: 0 % (ref 0–7)
ERYTHROCYTE [DISTWIDTH] IN BLOOD BY AUTOMATED COUNT: 13.5 % (ref 11.5–14.5)
GLOBULIN SER CALC-MCNC: 3.9 G/DL (ref 2–4)
GLUCOSE SERPL-MCNC: 135 MG/DL (ref 65–100)
HCT VFR BLD AUTO: 48.2 % (ref 36.6–50.3)
HETEROPH AB SER QL: NEGATIVE
HGB BLD-MCNC: 16 G/DL (ref 12.1–17)
IMM GRANULOCYTES # BLD AUTO: 0 K/UL (ref 0–0.04)
IMM GRANULOCYTES NFR BLD AUTO: 0 % (ref 0–0.5)
INR PPP: 1 (ref 0.9–1.1)
LIPASE SERPL-CCNC: 212 U/L (ref 73–393)
LYMPHOCYTES # BLD: 1.4 K/UL (ref 0.8–3.5)
LYMPHOCYTES NFR BLD: 27 % (ref 12–49)
MCH RBC QN AUTO: 29.7 PG (ref 26–34)
MCHC RBC AUTO-ENTMCNC: 33.2 G/DL (ref 30–36.5)
MCV RBC AUTO: 89.6 FL (ref 80–99)
MONOCYTES # BLD: 0.4 K/UL (ref 0–1)
MONOCYTES NFR BLD: 8 % (ref 5–13)
NEUTS SEG # BLD: 3.5 K/UL (ref 1.8–8)
NEUTS SEG NFR BLD: 65 % (ref 32–75)
NRBC # BLD: 0 K/UL (ref 0–0.01)
NRBC BLD-RTO: 0 PER 100 WBC
PLATELET # BLD AUTO: 279 K/UL (ref 150–400)
PMV BLD AUTO: 12 FL (ref 8.9–12.9)
POTASSIUM SERPL-SCNC: 3.1 MMOL/L (ref 3.5–5.1)
PROT SERPL-MCNC: 8.1 G/DL (ref 6.4–8.2)
PROTHROMBIN TIME: 10.6 SEC (ref 9–11.1)
RBC # BLD AUTO: 5.38 M/UL (ref 4.1–5.7)
SODIUM SERPL-SCNC: 138 MMOL/L (ref 136–145)
WBC # BLD AUTO: 5.3 K/UL (ref 4.1–11.1)

## 2019-03-25 PROCEDURE — 82977 ASSAY OF GGT: CPT

## 2019-03-25 PROCEDURE — 99218 HC RM OBSERVATION: CPT

## 2019-03-25 PROCEDURE — 85025 COMPLETE CBC W/AUTO DIFF WBC: CPT

## 2019-03-25 PROCEDURE — 74011250636 HC RX REV CODE- 250/636: Performed by: INTERNAL MEDICINE

## 2019-03-25 PROCEDURE — 76705 ECHO EXAM OF ABDOMEN: CPT

## 2019-03-25 PROCEDURE — 74177 CT ABD & PELVIS W/CONTRAST: CPT

## 2019-03-25 PROCEDURE — 83690 ASSAY OF LIPASE: CPT

## 2019-03-25 PROCEDURE — 96372 THER/PROPH/DIAG INJ SC/IM: CPT

## 2019-03-25 PROCEDURE — 86308 HETEROPHILE ANTIBODY SCREEN: CPT

## 2019-03-25 PROCEDURE — 96374 THER/PROPH/DIAG INJ IV PUSH: CPT

## 2019-03-25 PROCEDURE — 86376 MICROSOMAL ANTIBODY EACH: CPT

## 2019-03-25 PROCEDURE — 96375 TX/PRO/DX INJ NEW DRUG ADDON: CPT

## 2019-03-25 PROCEDURE — 74011250637 HC RX REV CODE- 250/637: Performed by: EMERGENCY MEDICINE

## 2019-03-25 PROCEDURE — 74011636320 HC RX REV CODE- 636/320: Performed by: RADIOLOGY

## 2019-03-25 PROCEDURE — 83516 IMMUNOASSAY NONANTIBODY: CPT

## 2019-03-25 PROCEDURE — 85610 PROTHROMBIN TIME: CPT

## 2019-03-25 PROCEDURE — 74011250637 HC RX REV CODE- 250/637: Performed by: INTERNAL MEDICINE

## 2019-03-25 PROCEDURE — 36415 COLL VENOUS BLD VENIPUNCTURE: CPT

## 2019-03-25 PROCEDURE — 99284 EMERGENCY DEPT VISIT MOD MDM: CPT

## 2019-03-25 PROCEDURE — 80053 COMPREHEN METABOLIC PANEL: CPT

## 2019-03-25 PROCEDURE — 86038 ANTINUCLEAR ANTIBODIES: CPT

## 2019-03-25 PROCEDURE — 80074 ACUTE HEPATITIS PANEL: CPT

## 2019-03-25 PROCEDURE — 96361 HYDRATE IV INFUSION ADD-ON: CPT

## 2019-03-25 PROCEDURE — 74011250636 HC RX REV CODE- 250/636: Performed by: EMERGENCY MEDICINE

## 2019-03-25 RX ORDER — SODIUM CHLORIDE 0.9 % (FLUSH) 0.9 %
5-40 SYRINGE (ML) INJECTION AS NEEDED
Status: DISCONTINUED | OUTPATIENT
Start: 2019-03-25 | End: 2019-03-27 | Stop reason: HOSPADM

## 2019-03-25 RX ORDER — OXYCODONE HYDROCHLORIDE 5 MG/1
5 TABLET ORAL
Status: DISCONTINUED | OUTPATIENT
Start: 2019-03-25 | End: 2019-03-27 | Stop reason: HOSPADM

## 2019-03-25 RX ORDER — SODIUM CHLORIDE 9 MG/ML
75 INJECTION, SOLUTION INTRAVENOUS CONTINUOUS
Status: DISPENSED | OUTPATIENT
Start: 2019-03-25 | End: 2019-03-26

## 2019-03-25 RX ORDER — KETOROLAC TROMETHAMINE 30 MG/ML
30 INJECTION, SOLUTION INTRAMUSCULAR; INTRAVENOUS
Status: DISCONTINUED | OUTPATIENT
Start: 2019-03-25 | End: 2019-03-27 | Stop reason: HOSPADM

## 2019-03-25 RX ORDER — SODIUM CHLORIDE 0.9 % (FLUSH) 0.9 %
5-40 SYRINGE (ML) INJECTION EVERY 8 HOURS
Status: DISCONTINUED | OUTPATIENT
Start: 2019-03-25 | End: 2019-03-27 | Stop reason: HOSPADM

## 2019-03-25 RX ORDER — POTASSIUM CHLORIDE 750 MG/1
40 TABLET, FILM COATED, EXTENDED RELEASE ORAL
Status: COMPLETED | OUTPATIENT
Start: 2019-03-25 | End: 2019-03-25

## 2019-03-25 RX ORDER — CARVEDILOL 3.12 MG/1
6.25 TABLET ORAL 2 TIMES DAILY WITH MEALS
Status: DISCONTINUED | OUTPATIENT
Start: 2019-03-26 | End: 2019-03-27 | Stop reason: HOSPADM

## 2019-03-25 RX ORDER — AMLODIPINE BESYLATE 5 MG/1
5 TABLET ORAL DAILY
Status: DISCONTINUED | OUTPATIENT
Start: 2019-03-26 | End: 2019-03-27 | Stop reason: HOSPADM

## 2019-03-25 RX ORDER — KETOROLAC TROMETHAMINE 30 MG/ML
60 INJECTION, SOLUTION INTRAMUSCULAR; INTRAVENOUS
Status: DISCONTINUED | OUTPATIENT
Start: 2019-03-25 | End: 2019-03-25

## 2019-03-25 RX ORDER — DICYCLOMINE HYDROCHLORIDE 10 MG/ML
20 INJECTION INTRAMUSCULAR
Status: COMPLETED | OUTPATIENT
Start: 2019-03-25 | End: 2019-03-25

## 2019-03-25 RX ORDER — OXYCODONE HYDROCHLORIDE 5 MG/1
5 TABLET ORAL
Status: COMPLETED | OUTPATIENT
Start: 2019-03-25 | End: 2019-03-25

## 2019-03-25 RX ORDER — ONDANSETRON 2 MG/ML
4 INJECTION INTRAMUSCULAR; INTRAVENOUS
Status: COMPLETED | OUTPATIENT
Start: 2019-03-25 | End: 2019-03-25

## 2019-03-25 RX ORDER — ONDANSETRON 2 MG/ML
4 INJECTION INTRAMUSCULAR; INTRAVENOUS
Status: DISCONTINUED | OUTPATIENT
Start: 2019-03-25 | End: 2019-03-27 | Stop reason: HOSPADM

## 2019-03-25 RX ORDER — IBUPROFEN 400 MG/1
400 TABLET ORAL
Status: DISCONTINUED | OUTPATIENT
Start: 2019-03-25 | End: 2019-03-27 | Stop reason: HOSPADM

## 2019-03-25 RX ADMIN — ONDANSETRON 4 MG: 2 INJECTION INTRAMUSCULAR; INTRAVENOUS at 14:38

## 2019-03-25 RX ADMIN — OXYCODONE HYDROCHLORIDE 5 MG: 5 TABLET ORAL at 22:28

## 2019-03-25 RX ADMIN — Medication 10 ML: at 22:02

## 2019-03-25 RX ADMIN — IOPAMIDOL 100 ML: 755 INJECTION, SOLUTION INTRAVENOUS at 15:26

## 2019-03-25 RX ADMIN — OXYCODONE HYDROCHLORIDE 5 MG: 5 TABLET ORAL at 17:24

## 2019-03-25 RX ADMIN — POTASSIUM CHLORIDE 40 MEQ: 750 TABLET, FILM COATED, EXTENDED RELEASE ORAL at 16:00

## 2019-03-25 RX ADMIN — SODIUM CHLORIDE 75 ML/HR: 900 INJECTION, SOLUTION INTRAVENOUS at 22:02

## 2019-03-25 RX ADMIN — DICYCLOMINE HYDROCHLORIDE 20 MG: 20 INJECTION, SOLUTION INTRAMUSCULAR at 15:00

## 2019-03-25 RX ADMIN — SODIUM CHLORIDE 1000 ML: 900 INJECTION, SOLUTION INTRAVENOUS at 14:39

## 2019-03-25 NOTE — TELEPHONE ENCOUNTER
Patient contacted the office again. I passed on the message that you wanted to see him to reexamine him and schedule testing and the patient did not want to wait that long to have this taken care of. He stated something else but I was unable to understand what was said. I informed him that I would pass a message on that he did not want to schedule the appt.

## 2019-03-25 NOTE — ED NOTES
Patient Throughput:  Charge nurse on 5th floor made aware of patient's room assignment, room [address] [facility] [address], [address] [facility] Resource Nurse  Emergency Department

## 2019-03-25 NOTE — ED TRIAGE NOTES
Patient c/o lower, mid abd pain \" going into my back\". States that this happen 1 month ago and went away, then it came back this morning. Patient has hx of \" the flu\", patient completed Tamiflu yesterday.

## 2019-03-25 NOTE — PROGRESS NOTES
[facility]: MED RECONCILIATION    Comments/Recommendations:   Reviewed PTA medications with patient at bedside. Patient is knowledgeable regarding his home medications. He took all of his meds this morning. Medications removed:    Cialis    Information obtained from: Patient at bedside, RxQuery    Allergies: Nifedipine and Quinine    Prior to Admission Medications:     Medication Documentation Review Audit       Reviewed by CHAPO MelgozaD (Pharmacist) on 03/25/19 at 1724      Medication Sig Documenting Provider Last Dose Status Taking? amLODIPine (NORVASC) 5 mg tablet TAKE 1 TABLET BY MOUTH EVERY DAY Luc King MD 3/25/2019 Active Yes   aspirin delayed-release 81 mg tablet Take 81 mg by mouth daily. Provider, Historical 3/25/2019 Active Yes   atorvastatin (LIPITOR) 20 mg tablet Take 1 Tab by mouth daily. Doug Heart MD 3/25/2019 Active Yes   carvedilol (COREG) 6.25 mg tablet TAKE 1 TABLET BY MOUTH TWICE A DAY WITH FOOD Mary Ellen Mittal MD 3/25/2019 Active Yes   enalapril (VASOTEC) 20 mg tablet TAKE 1 TABLET BY MOUTH TWICE A DAY **NEED APPT** Mary Ellen Mittal MD 3/25/2019 Active Yes   hydroCHLOROthiazide (HYDRODIURIL) 25 mg tablet Take 1 Tab by mouth daily. Doug Heart MD 3/25/2019 Active Yes   naproxen sodium (ALEVE) 220 mg tablet Take 220 mg by mouth two (2) times daily as needed for Pain.  Luc King MD 3/25/2019 Active Yes                  Carolynn Garcia, PHARMD   Contact: [phone]

## 2019-03-25 NOTE — ROUTINE PROCESS
TRANSFER - OUT REPORT:    Verbal report given to Halima SEBASTIAN(name) on Az Leiva  being transferred to 528(unit) for routine progression of care       Report consisted of patients Situation, Background, Assessment and   Recommendations(SBAR). Information from the following report(s) ED Summary was reviewed with the receiving nurse. Lines:   Peripheral IV 03/25/19 Left Antecubital (Active)   Site Assessment Clean, dry, & intact 3/25/2019  2:16 PM   Phlebitis Assessment 0 3/25/2019  2:16 PM   Infiltration Assessment 0 3/25/2019  2:16 PM   Dressing Status Clean, dry, & intact 3/25/2019  2:16 PM   Dressing Type Tape;Transparent 3/25/2019  2:16 PM   Hub Color/Line Status Pink;Flushed;Patent 3/25/2019  2:16 PM   Action Taken Blood drawn 3/25/2019  2:16 PM   Alcohol Cap Used Yes 3/25/2019  2:16 PM        Opportunity for questions and clarification was provided.

## 2019-03-25 NOTE — PROGRESS NOTES
Verbal shift change report given to Nelson Pablo (oncoming nurse) by Dotty Still (offgoing nurse). Report included the following information ED Summary, Intake/Output, MAR and Recent Results.

## 2019-03-25 NOTE — ED PROVIDER NOTES
66 y.o. male with past medical history significant for hyperlipidemia, hypercholesterolemia, HTN, arthritis, DM, and CAD who presents from private vehicle with chief complaint of abdominal pain. Pt reports intermittent RUQ and epigastric abdominal pain, accompanied by back pain for 1 month, which worsened at 0600 this morning. Pt also c/o nausea today. Pt states he was recently sick with flu symptoms. Pt notes he has a history of cardiac stent placement. Pt denies vomiting. There are no other acute medical concerns at this time. Social hx: Alcohol use. Tobacco use. PCP: Joaquín Hansen MD    Note written by Hiram Longoria, as dictated by Radha Pineda MD 2:00 PM      The history is provided by the patient. No  was used.         Past Medical History:   Diagnosis Date    Arthritis     mainly knees, off and on    Coronary artery disease of native artery of native heart with stable angina pectoris ([facility] [address] 9/13/2016    1 stent - RCA - Dr. Markham [facility] DM (diabetes mellitus) ([facility] [address] 12/10/2012    pre-diabetes    ED (erectile dysfunction) 4/17/2009    HTN 4/17/2009    Hypercholesterolemia     Hyperlipemia 4/17/2009       Past Surgical History:   Procedure Laterality Date    CARDIAC SURG PROCEDURE UNLIST  09/22/2016    1 stent    HX CORONARY STENT PLACEMENT      HX HEART CATHETERIZATION      HX PTCA           Family History:   Problem Relation Age of Onset    Heart Disease Mother         premature    Heart Attack Mother     Hypertension Father     Stroke Father     Hypertension Sister     Diabetes Sister     Stroke Sister     No Known Problems Brother     Diabetes Sister     Cancer Neg Hx        Social History     Socioeconomic History    Marital status:      Spouse name: Not on file    Number of children: Not on file    Years of education: Not on file    Highest education level: Not on file   Occupational History    Not on file   Social Needs    Financial resource strain: Not on file    Food insecurity:     Worry: Not on file     Inability: Not on file    Transportation needs:     Medical: Not on file     Non-medical: Not on file   Tobacco Use    Smoking status: Light Tobacco Smoker     Types: Cigars     Last attempt to quit: 2004     Years since quittin.9    Smokeless tobacco: Never Used    Tobacco comment: 2-3 times a year   Substance and Sexual Activity    Alcohol use: Yes     Alcohol/week: 0.5 oz     Types: 1 Cans of beer per week    Drug use: No    Sexual activity: Yes     Partners: Female   Lifestyle    Physical activity:     Days per week: Not on file     Minutes per session: Not on file    Stress: Not on file   Relationships    Social connections:     Talks on phone: Not on file     Gets together: Not on file     Attends Orthodoxy service: Not on file     Active member of club or organization: Not on file     Attends meetings of clubs or organizations: Not on file     Relationship status: Not on file    Intimate partner violence:     Fear of current or ex partner: Not on file     Emotionally abused: Not on file     Physically abused: Not on file     Forced sexual activity: Not on file   Other Topics Concern    Not on file   Social History Narrative    Not on file         ALLERGIES: Nifedipine and Quinine    Review of Systems   Constitutional: Negative for chills and fever. Respiratory: Negative for shortness of breath. Cardiovascular: Negative for chest pain. Gastrointestinal: Positive for abdominal pain and nausea. Negative for constipation, diarrhea and vomiting. Musculoskeletal: Positive for back pain. Neurological: Negative for dizziness and light-headedness. All other systems reviewed and are negative. There were no vitals filed for this visit. Physical Exam   Constitutional: He appears well-developed and well-nourished. No distress. HENT:   Head: Normocephalic and atraumatic.    Eyes: Pupils are equal, round, and reactive to light. Conjunctivae are normal.   Neck: Normal range of motion. Cardiovascular: Normal rate and regular rhythm. Pulmonary/Chest: Effort normal and breath sounds normal.   Expiratory rhonchi. Abdominal: Soft. He exhibits no distension. There is tenderness in the right upper quadrant and epigastric area. There is negative Zee's sign. RUQ and epigastric tenderness. Negative zee's sign. Musculoskeletal: Normal range of motion. Neurological: He is alert. Skin: Skin is dry. Capillary refill takes less than 2 seconds. Nursing note and vitals reviewed. Note written by Hiram Morillo, as dictated by Sol Davies MD 1:55 PM    MDM  Number of Diagnoses or Management Options  Diagnosis management comments: DDX includes acute appendicitis, bowel obstruction, incarcerated hernia, acute cholecystitis, bowel perforation, major gastrointestinal bleeding, severe diverticulitis, sepsis, choledocholithiasis, and hepatitis. Procedures    PROGRESS NOTE:  4:20 PM  Updated Pt about CT results, and he is currently going to [address] [facility]. CONSULT NOTE:  5:07 PM Sol Davies MD spoke with Dr. Leigh Cain, Consult for Surgery. Discussed available diagnostic tests and clinical findings. Dr. Leigh Cain recommends Pt will need medical admission, if his bilirubin and LFT's go down then general surgery will be able to remove Pt's gallbladder, if not gastroenterology will schedule Pt for an ERCP. CONSULT NOTE:  5:27 PM Sol Davies MD spoke with Dr. Ana Bro, Consult for Hospitalist.  Discussed available diagnostic tests and clinical findings. Dr. Ana Bro will admit Pt. Patient is being admitted to the hospital.  The results of their tests and reasons for their admission have been discussed with them and/or available family. They convey agreement and understanding for the need to be admitted and for their admission diagnosis.

## 2019-03-25 NOTE — H&P
Admission History and Physical      NAME:  Haily Arreola   :   1960   MRN:  [id number]     PCP:  Alma Delia Sharma MD     Date/Time:  3/25/2019           Assessment/Plan:       Active Problems:    Abdominal pain (3/25/2019) etiology unclear, possible retained stone, imaging is unremarkable and clinically unremarkable, will be placed in obs status, will be fed with diabetic diet,,  Regarding abnormal LFT  I am not very impressed, , sonogram not impressive, will repeat lft if its not improving will consider MRCP,it could also be viral since patient was just battling flu,  HTn stable continue meds,  CAD s/p stent continue outpt meds,   DVT px ambulation, he is obs status,                Subjective:     CHIEF COMPLAINT:   Right sided abdominal pain     HISTORY OF PRESENT ILLNESS:     Mr. Greg Mckeon is a 66 y.o.   afican [place]  male who is admitted with  Abdominal pain.   Mr. Greg Mckeon presented to the Emergency Department today complaining of  Right sided abdominal pain which has been going on for the past day or so, no nausea , no vomiting, no fever, no chills, no diarrhea, no constipation, He recently has been battling the flu as per patient and recently returned from [facility] where he was for his sisters , he had similar complain in the past but it resolved with advil,In the Ed he was found to have elevated LFT, ulysses of about 2.1, he denies any regular alcohol use, hw appeared very comfortable, and in no acute distress, even though he complained of pain he is in no distressed, vitals in the Ed stable, he received iv pain meds,      Past Medical History:   Diagnosis Date    Arthritis     mainly knees, off and on    Coronary artery disease of native artery of native heart with stable angina pectoris ([address] 2016    1 stent - RCA - Dr. Ginny Cid    DM (diabetes mellitus) ([facility] [address] 12/10/2012    pre-diabetes    ED (erectile dysfunction) 2009    HTN 2009    Hypercholesterolemia     Hyperlipemia 2009        Past Surgical History:   Procedure Laterality Date    CARDIAC SURG PROCEDURE UNLIST  2016    1 stent    HX CORONARY STENT PLACEMENT      HX HEART CATHETERIZATION      HX PTCA         Social History     Tobacco Use    Smoking status: Light Tobacco Smoker     Types: Cigars     Last attempt to quit: 2004     Years since quittin.9    Smokeless tobacco: Never Used    Tobacco comment: 2-3 times a year   Substance Use Topics    Alcohol use: Yes     Alcohol/week: 0.5 oz     Types: 1 Cans of beer per week        Family History   Problem Relation Age of Onset    Heart Disease Mother         premature    Heart Attack Mother     Hypertension Father    [de-identified] Stroke Father     Hypertension Sister     Diabetes Sister     Stroke Sister     No Known Problems Brother     Diabetes Sister     Cancer Neg Hx         Allergies   Allergen Reactions    Nifedipine Other (comments)     Caused terrible feeling/not himself/drowsey     Quinine Itching        Prior to Admission medications    Medication Sig Start Date End Date Taking? Authorizing Provider   hydroCHLOROthiazide (HYDRODIURIL) 25 mg tablet Take 1 Tab by mouth daily. 3/6/19  Yes Yessica Pryor MD   atorvastatin (LIPITOR) 20 mg tablet Take 1 Tab by mouth daily. 3/6/19  Yes Yessica Pryor MD   carvedilol (COREG) 6.25 mg tablet TAKE 1 TABLET BY MOUTH TWICE A DAY WITH FOOD 2/3/19  Yes Jose Bateman MD   enalapril (VASOTEC) 20 mg tablet TAKE 1 TABLET BY MOUTH TWICE A DAY **NEED APPT** 2/3/19  Yes Jose Bateman MD   amLODIPine (NORVASC) 5 mg tablet TAKE 1 TABLET BY MOUTH EVERY DAY 18  Yes Johanna Montague MD   naproxen sodium (ALEVE) 220 mg tablet Take 220 mg by mouth two (2) times daily as needed for Pain. Yes Johanna Montague MD   aspirin delayed-release 81 mg tablet Take 81 mg by mouth daily.    Yes Provider, Historical         Review of Systems:  (bold if positive, if negative)    Gen: Eyes:  ENT:  CVS:  Pulm:  GI:    :    MS:  Skin:  Psych:  Endo:    Hem:  Renal:    Neuro:            Objective:      VITALS:    Vital signs reviewed; most recent are:    Visit Vitals  /87   Pulse 88   Temp 98.2 °F (36.8 °C)   Resp 20   Ht 6' 1\" (1.854 m)   Wt 106.6 kg (235 lb)   SpO2 100%   BMI 31.00 kg/m²     SpO2 Readings from Last 6 Encounters:   03/25/19 100%   02/20/19 100%   06/14/18 97%   11/29/17 99%   05/02/17 98%   11/01/16 98%            Intake/Output Summary (Last 24 hours) at 3/25/2019 1926  Last data filed at 3/25/2019 1600  Gross per 24 hour   Intake 1000 ml   Output --   Net 1000 ml            Exam:     Physical Exam:    Gen:  Well-developed, well-nourished, in no acute distress  HEENT:  Pink conjunctivae, PERRL, hearing intact to voice, moist mucous membranes  Neck:  Supple  Resp:  No accessory muscle use, clear breath sounds without wheezes rales or rhonchi  Card:  No murmurs, normal S1, S2 without thrills, bruits or peripheral edema, radial pulses 2+ b/l  Abd:  Soft, non-tender, non-distended, normoactive bowel sounds are present  Lymph:  No cervical adenopathy  Musc:  No cyanosis, cap refill < 2 sec  Skin:  No rashes or ulcers, skin turgor is good  Neuro:  Cranial nerves 3-12 are grossly intact,  strength is 5/5 bilaterally, dorsi / plantarflexion strength is 5/5 bilaterally, follows commands appropriately  Psych:  Alert with good insight. Oriented to person, place, and time       Labs:    Recent Labs     03/25/19  1411   WBC 5.3   HGB 16.0   HCT 48.2        Recent Labs     03/25/19  1411      K 3.1*      CO2 29   *   BUN 17   CREA 1.05   CA 9.1   ALB 4.2   TBILI 2.8*   SGOT 747*   *     No results found for: GLUCPOC  No results for input(s): PH, PCO2, PO2, HCO3, FIO2 in the last 72 hours. No results for input(s): INR in the last 72 hours.     No lab exists for component: INREXT    Chest Xray:  Chest X-ray: Normal.Results reviewed with Radiologist.  EKG reviewed:   Initial EKG: Normal rate, rhythm, axis and intervals.     Medical records reviewed in preparation for this admission: {reviewed                   Care Plan discussed with: nursing staff    Discussed:  patient    Prophylaxis:  ambulation    Probable Disposition:  home           ___________________________________________________    Attending Physician: Best Nagy MD

## 2019-03-25 NOTE — TELEPHONE ENCOUNTER
Patient contacted the office. He states he was told to contact Dr. April Vazquez if his symptoms were continuing and the patient states they are. Are you able to speak with patient or advise?

## 2019-03-26 ENCOUNTER — APPOINTMENT (OUTPATIENT)
Dept: MRI IMAGING | Age: 59
DRG: 446 | End: 2019-03-26
Attending: INTERNAL MEDICINE
Payer: COMMERCIAL

## 2019-03-26 PROBLEM — K80.20 SYMPTOMATIC CHOLELITHIASIS: Status: ACTIVE | Noted: 2019-03-26

## 2019-03-26 LAB
ALBUMIN SERPL-MCNC: 3.5 G/DL (ref 3.5–5)
ALBUMIN/GLOB SERPL: 1.1 {RATIO} (ref 1.1–2.2)
ALP SERPL-CCNC: 142 U/L (ref 45–117)
ALT SERPL-CCNC: 404 U/L (ref 12–78)
ANION GAP SERPL CALC-SCNC: 9 MMOL/L (ref 5–15)
AST SERPL-CCNC: 285 U/L (ref 15–37)
BILIRUB DIRECT SERPL-MCNC: 0.4 MG/DL (ref 0–0.2)
BILIRUB SERPL-MCNC: 1.7 MG/DL (ref 0.2–1)
BUN SERPL-MCNC: 11 MG/DL (ref 6–20)
BUN/CREAT SERPL: 13 (ref 12–20)
CALCIUM SERPL-MCNC: 8.7 MG/DL (ref 8.5–10.1)
CHLORIDE SERPL-SCNC: 104 MMOL/L (ref 97–108)
CO2 SERPL-SCNC: 26 MMOL/L (ref 21–32)
CREAT SERPL-MCNC: 0.86 MG/DL (ref 0.7–1.3)
ERYTHROCYTE [DISTWIDTH] IN BLOOD BY AUTOMATED COUNT: 13.4 % (ref 11.5–14.5)
GGT SERPL-CCNC: 1547 U/L (ref 15–85)
GLOBULIN SER CALC-MCNC: 3.3 G/DL (ref 2–4)
GLUCOSE SERPL-MCNC: 98 MG/DL (ref 65–100)
HAV IGM SER QL: NONREACTIVE
HBV CORE IGM SER QL: NONREACTIVE
HBV SURFACE AG SER QL: 0.25 INDEX
HBV SURFACE AG SER QL: NEGATIVE
HCT VFR BLD AUTO: 42.8 % (ref 36.6–50.3)
HCV AB SERPL QL IA: NONREACTIVE
HCV COMMENT,HCGAC: NORMAL
HGB BLD-MCNC: 14.5 G/DL (ref 12.1–17)
LIPASE SERPL-CCNC: 131 U/L (ref 73–393)
MCH RBC QN AUTO: 30.5 PG (ref 26–34)
MCHC RBC AUTO-ENTMCNC: 33.9 G/DL (ref 30–36.5)
MCV RBC AUTO: 89.9 FL (ref 80–99)
NRBC # BLD: 0 K/UL (ref 0–0.01)
NRBC BLD-RTO: 0 PER 100 WBC
PLATELET # BLD AUTO: 219 K/UL (ref 150–400)
PMV BLD AUTO: 11.5 FL (ref 8.9–12.9)
POTASSIUM SERPL-SCNC: 3 MMOL/L (ref 3.5–5.1)
PROT SERPL-MCNC: 6.8 G/DL (ref 6.4–8.2)
RBC # BLD AUTO: 4.76 M/UL (ref 4.1–5.7)
SODIUM SERPL-SCNC: 139 MMOL/L (ref 136–145)
SP1: NORMAL
SP2: NORMAL
SP3: NORMAL
WBC # BLD AUTO: 5.3 K/UL (ref 4.1–11.1)

## 2019-03-26 PROCEDURE — 96366 THER/PROPH/DIAG IV INF ADDON: CPT

## 2019-03-26 PROCEDURE — 74181 MRI ABDOMEN W/O CONTRAST: CPT

## 2019-03-26 PROCEDURE — 74011250636 HC RX REV CODE- 250/636: Performed by: INTERNAL MEDICINE

## 2019-03-26 PROCEDURE — 74011250636 HC RX REV CODE- 250/636: Performed by: STUDENT IN AN ORGANIZED HEALTH CARE EDUCATION/TRAINING PROGRAM

## 2019-03-26 PROCEDURE — 96365 THER/PROPH/DIAG IV INF INIT: CPT

## 2019-03-26 PROCEDURE — 80048 BASIC METABOLIC PNL TOTAL CA: CPT

## 2019-03-26 PROCEDURE — 85027 COMPLETE CBC AUTOMATED: CPT

## 2019-03-26 PROCEDURE — 99218 HC RM OBSERVATION: CPT

## 2019-03-26 PROCEDURE — 80076 HEPATIC FUNCTION PANEL: CPT

## 2019-03-26 PROCEDURE — 36415 COLL VENOUS BLD VENIPUNCTURE: CPT

## 2019-03-26 PROCEDURE — 83690 ASSAY OF LIPASE: CPT

## 2019-03-26 PROCEDURE — 74011250637 HC RX REV CODE- 250/637: Performed by: INTERNAL MEDICINE

## 2019-03-26 RX ORDER — INDOCYANINE GREEN AND WATER 25 MG
5 KIT INJECTION ONCE
Status: CANCELLED | OUTPATIENT
Start: 2019-03-26 | End: 2019-03-27

## 2019-03-26 RX ORDER — CEFAZOLIN SODIUM/WATER 2 G/20 ML
2 SYRINGE (ML) INTRAVENOUS ONCE
Status: CANCELLED | OUTPATIENT
Start: 2019-03-27 | End: 2019-03-27

## 2019-03-26 RX ORDER — POTASSIUM CHLORIDE 7.45 MG/ML
10 INJECTION INTRAVENOUS
Status: COMPLETED | OUTPATIENT
Start: 2019-03-26 | End: 2019-03-26

## 2019-03-26 RX ADMIN — CARVEDILOL 6.25 MG: 3.12 TABLET, FILM COATED ORAL at 17:09

## 2019-03-26 RX ADMIN — POTASSIUM CHLORIDE 10 MEQ: 10 INJECTION, SOLUTION INTRAVENOUS at 09:23

## 2019-03-26 RX ADMIN — POTASSIUM CHLORIDE 10 MEQ: 10 INJECTION, SOLUTION INTRAVENOUS at 12:27

## 2019-03-26 RX ADMIN — SODIUM CHLORIDE 75 ML/HR: 900 INJECTION, SOLUTION INTRAVENOUS at 17:09

## 2019-03-26 RX ADMIN — POTASSIUM CHLORIDE 10 MEQ: 10 INJECTION, SOLUTION INTRAVENOUS at 14:16

## 2019-03-26 RX ADMIN — Medication 10 ML: at 14:16

## 2019-03-26 RX ADMIN — Medication 10 ML: at 05:20

## 2019-03-26 RX ADMIN — POTASSIUM CHLORIDE 10 MEQ: 10 INJECTION, SOLUTION INTRAVENOUS at 08:10

## 2019-03-26 RX ADMIN — CARVEDILOL 6.25 MG: 3.12 TABLET, FILM COATED ORAL at 08:10

## 2019-03-26 RX ADMIN — Medication 10 ML: at 22:06

## 2019-03-26 RX ADMIN — AMLODIPINE BESYLATE 5 MG: 5 TABLET ORAL at 08:09

## 2019-03-26 NOTE — PROGRESS NOTES
[address] [facility] [address] [place] [address]   Office [phone]  Fax [phone] [address] [facility] Residency Attending Addendum:  I saw and evaluated the patient on the day of the encounter with Dr. Campos Mcgill, performing the atkins elements of the service. I discussed the findings, assessment and plan with the resident and agree with the resident's findings and plan as documented in the resident's note. Pain improved. LFTs still high. Appreciate GI and Gen Surg recommendations. Pending MRCP. Yoselin Kinsey MD, CAQSM, RMSK           Assessment and Plan     Shikha Lamb is a 66 y.o. male admitted for Abdominal pain. He has spent 0 night(s) in the hospital.    24 Hour Events: No acute events. RUQ Abdominal pain- Likely secondary to Biliary colic vs choledocolithiasis vs hepatitis. Patient with RUQ pain and elevated LFTs on admission. Patient is afebrile and no leukocytosis on admission making cholangitis and cholecystitis unlikely. On admission CT abd showed Gallbladder sludge ball versus mass versus polyp. Mild intra and extrahepatic biliary dilatation. Abd US with Cholelithiasis with mild extrahepatic biliary dilatation. Common bile duct stone not demonstrated with ultrasound technique.   - NPO for possible ERCP  - Consider MRCP   - F/u Hepatitis panel, EDWIN, Smooth muscle antibody, mitochondrial ab, liver-kidney microsomal ab, mononucleosis screen  - GI consulted, appreciate recs     Pre Diabetes - HgA1c 5.4 (6/14/18)  - Diet controlled  - Daily CMP     Hypertension- BP on admission 153/79. At this time, 134/77.  - Continuing home medications of norvasc 5 mg tab daily, coreg 6.25 mg tab BID, enalapril 20 mg tab BID, hydrochlorothiazide 25 mg tab daily.    - Will continue to monitor at this time and readjust as BP's trend.     CAD- s/p stent placement 2016  - Continue home ASA 81 mg tab  - Continue home lipitor 20 mg tab qhs     Hypokalemia - POA K 3.1 s/p klor-marlen 40 meq tab  - daily CMP  - replete as needed     HLD - T. Chol 144, TL 62, HDL 60, LDL 72 (2017)  - Continue home lipitor 20 mg tab qhs     Obesity  - PT with BMI of Body mass index is 31 kg/m². - Encouraging lifestyle modifications and further follow up outpatient.         FEN/GI - NPO   Activity - Ambulate as tolerated  DVT prophylaxis - SCDs, start lovenox after possible ERCP  GI prophylaxis - Not indicated at this time  Fall prophylaxis - Not indicated at this time. Disposition - Admit to Medical. Plan to d/c to Home. Code Status - Full, discussed with patient / caregivers. Next of Emeli 69 Name and Contact Spouse [phone]      Wander Patiño MD  Family Medicine Resident         Subjective / Objective     Subjective   Patient denies any abdominal pain at this time. Urinating appropriately. Last BM about 24 hours ago. Passing flatus. Currently NPO. Denies any N/V/ diarrhea, SOB or chest pain. Temp (24hrs), Av.4 °F (36.9 °C), Min:98.2 °F (36.8 °C), Max:98.6 °F (37 °C)     Objective  Respiratory:   O2 Device: Room air   Visit Vitals  /77 (BP 1 Location: Right arm, BP Patient Position: At rest)   Pulse 71   Temp 98.6 °F (37 °C)   Resp 17   Ht 6' 1\" (1.854 m)   Wt 235 lb (106.6 kg)   SpO2 98%   BMI 31.00 kg/m²       General: No acute distress. Alert. Cooperative. Respiratory: CTAB. No w/r/r/c.   Cardiovascular: RRR. Normal S1,S2. No m/r/g. Pulses 2+ throughout. GI: + bowel sounds. Mild tenderness in epigastrium and RUQ. Negative Zee's sign. No rebound tenderness or guarding. Nondistended. Extremities:  No LE edema. Distal pulses intact. Musculoskeletal: Full ROM in all extremities. Skin: Warm, dry. No rashes. Neuro: CN II-XII grossly intact. Strength 5/5 in all extremities. I/O:  Date 19 0700 - 19 0659 19 0700 - 19 0659   Shift 5685-5239 6432-7332 24 Hour Total 6520-3599 3384-5729 24 Hour Total   INTAKE   I.V.(mL/kg/hr) 1000(0.8) 186.3 1186. 3        Volume (sodium chloride 0.9 % bolus infusion 1,000 mL) 1000  1000        Volume (0.9% sodium chloride infusion)  186.3 186. 3      Shift Total(mL/kg) 1000(9.4) 186.3(1.7) 1186. 3(11.1)      OUTPUT   Urine(mL/kg/hr)           Urine Occurrence(s)  1 x 1 x      Shift Total(mL/kg)         NET 1000 186.3 1186. 3      Weight (kg) 106.6 106.6 106.6 106.6 106.6 106.6       CBC:  Recent Labs     03/25/19  1411   WBC 5.3   HGB 16.0   HCT 48.2          Metabolic Panel:  Recent Labs     03/25/19  2153 03/25/19  1411   NA  --  138   K  --  3.1*   CL  --  102   CO2  --  29   BUN  --  17   CREA  --  1.05   GLU  --  135*   CA  --  9.1   ALB  --  4.2   SGOT  --  747*   ALT  --  391*   INR 1.0  --           For Billing    Chief Complaint   Patient presents with    Abdominal Pain    Back Pain       Hospital Problems  Date Reviewed: 5/2/2017          Codes Class Noted POA    Abdominal pain ICD-10-CM: R10.9  ICD-9-CM: 789.00  3/25/2019 Yes        * (Principal) Elevated LFTs ICD-10-CM: R94.5  ICD-9-CM: 790.6  3/25/2019 Yes        HTN (hypertension) ICD-10-CM: I10  ICD-9-CM: 401.9  4/17/2009 Yes        Hyperlipemia ICD-10-CM: E78.5  ICD-9-CM: 272.4  4/17/2009 Yes

## 2019-03-26 NOTE — PROGRESS NOTES
Please complete MRI History and Safety Screening Form for this patient    Using [employer] only under Orders Requiring a Screening Form:    Example:  Orders Requiring a Screening Form       Procedure                    Order Status                      Form Status       MRI EXAM                   Active                                In Progress    Click on blue hyperlink as shown above to launch MRI screening form  Answer all questions completely including Weight, Surgery, and Implant History. Document MUST be \"eSigned\" using a \"Signature Pad\" by the person completing form.  (patient and RN or MD completing form with the patient)  Patient cannot be scanned until this form is completed and reviewed in MRI to ensure patient is SAFE and eligible for MRI. Call MRI when this has been successfully completed at [phone]. This must be done under [facility]. Do not use the Nursing Flowsheet.

## 2019-03-26 NOTE — PROGRESS NOTES
[address] [facility] [address] [place] [address]   Office [phone]  Fax [phone]       Admission H&P     Name: Jaycee Alex MRN: [id number]  Sex: Male   YOB: 1960  Age: 66 y.o. PCP: Michael Pop MD     Source of Information: patient, medical records    Chief complaint: abdominal pain     History of Present Illness  Jaycee Alex is a 66 y.o. male with known arthritis, CAD s/p stent placement (2016), Pre DM, HTN, HLD  who presents to the ER complaining of abdominal pain. Aproximately 12 hours ago patient presented with RUQ pain that radiated to back, 7/10 pain, cramping type associated with nausea. Patient mentions his last meal was fried rice and chicken for dinner the night before. Presented with similar pain a month ago that resolved on its own after three hours. Patient recently came back from [facility] two weeks ago and had the flu a couple of days later which resolved three days ago. In the ED:  - Vitals - T 98.3, pulse 71, RR 18, /79, O2 sat 100% R. A  - Labs - WBC 5.3, potassium 3.1, Glucose 135, , , Alk phos 146, T.bili 2.8, lipase 212 (normal),   - Imaging - CT abd: Gallbladder sludge ball versus mass versus polyp. Mild intra and extrahepatic biliary dilatation. Abd US Cholelithiasis with mild extrahepatic biliary dilatation. Common bile duct stone not demonstrated with ultrasound technique.  Intrahepatic biliary dilatation not as evident on ultrasound  - Treatment - Zofran 4 mg IV x1, 1L NS bolus x1, dicyclomine 20 mg IM x1, klor-con 40 meq x1, roxicodone 5mg tab x1,      EKG: not ordered    Patient Vitals for the past 12 hrs:   Temp Pulse Resp BP SpO2   03/25/19 1935 98.3 °F (36.8 °C) 71 18 153/79 100 %   03/25/19 1848 98.2 °F (36.8 °C) 88 20 140/87 100 %   03/25/19 1435 -- -- -- -- 99 %   03/25/19 1357 -- 82 18 (!) 168/98 100 %       Review of Systems  Review of Systems   Constitutional: Negative for activity change, appetite change, chills, fatigue and fever. HENT: Negative for congestion and sore throat. Eyes: Negative for visual disturbance. Respiratory: Negative for cough, chest tightness and shortness of breath. Cardiovascular: Negative for chest pain and palpitations. Gastrointestinal: Positive for abdominal pain and nausea. Negative for blood in stool, diarrhea and vomiting. Endocrine: Negative for polyphagia and polyuria. Genitourinary: Negative for dysuria, hematuria and urgency. Musculoskeletal: Negative for arthralgias and myalgias. Skin: Negative for color change. Neurological: Negative for dizziness, syncope, weakness, light-headedness and headaches. Hematological: Negative for adenopathy. Does not bruise/bleed easily. Psychiatric/Behavioral: Negative for confusion. Home Medications   Prior to Admission medications    Medication Sig Start Date End Date Taking? Authorizing Provider   hydroCHLOROthiazide (HYDRODIURIL) 25 mg tablet Take 1 Tab by mouth daily. 3/6/19  Yes Daniel Ashford MD   atorvastatin (LIPITOR) 20 mg tablet Take 1 Tab by mouth daily. 3/6/19  Yes Daniel Ashford MD   carvedilol (COREG) 6.25 mg tablet TAKE 1 TABLET BY MOUTH TWICE A DAY WITH FOOD 2/3/19  Yes Jeannette Bateman MD   enalapril (VASOTEC) 20 mg tablet TAKE 1 TABLET BY MOUTH TWICE A DAY **NEED APPT** 2/3/19  Yes Jeannette Bateman MD   amLODIPine (NORVASC) 5 mg tablet TAKE 1 TABLET BY MOUTH EVERY DAY 12/11/18  Yes Rosenda Mims MD   naproxen sodium (ALEVE) 220 mg tablet Take 220 mg by mouth two (2) times daily as needed for Pain. Yes Rosenda Mims MD   aspirin delayed-release 81 mg tablet Take 81 mg by mouth daily.    Yes Provider, Historical       Allergies  Allergies   Allergen Reactions    Nifedipine Other (comments)     Caused terrible feeling/not himself/drowsey     Quinine Itching       Past Medical History:   Diagnosis Date    Arthritis     mainly knees, off and on    Coronary artery disease of native artery of native heart with stable angina pectoris ([facility] [address] 2016    1 stent - RCA - Dr. Chandan Cisneros    [facility]di[facility] 12/10/2012    pre-diabetes    ED (erectile dysfunction) 2009    HTN 2009    Hypercholesterolemia     Hyperlipemia 2009       Previous Hospitalization(s)  Past Surgical History:   Procedure Laterality Date    CARDIAC SURG PROCEDURE UNLIST  2016    1 stent    HX CORONARY STENT PLACEMENT      HX HEART CATHETERIZATION      HX PTCA         Family History   Problem Relation Age of Onset    Heart Disease Mother         premature    Heart Attack Mother     Hypertension Father     Stroke Father     Hypertension Sister     Diabetes Sister     Stroke Sister     No Known Problems Brother     Diabetes Sister     Cancer Neg Hx        Social History  Social History     Socioeconomic History    Marital status:      Spouse name: Not on file    Number of children: Not on file    Years of education: Not on file    Highest education level: Not on file   Occupational History    Not on file   Social Needs    Financial resource strain: Not on file    Food insecurity:     Worry: Not on file     Inability: Not on file    Transportation needs:     Medical: Not on file     Non-medical: Not on file   Tobacco Use    Smoking status: Light Tobacco Smoker     Types: Cigars     Last attempt to quit: 2004     Years since quittin.9    Smokeless tobacco: Never Used    Tobacco comment: 2-3 times a year   Substance and Sexual Activity    Alcohol use:  Yes     Alcohol/week: 0.5 oz     Types: 1 Cans of beer per week    Drug use: No    Sexual activity: Yes     Partners: Female   Lifestyle    Physical activity:     Days per week: Not on file     Minutes per session: Not on file    Stress: Not on file   Relationships    Social connections:     Talks on phone: Not on file     Gets together: Not on file     Attends Druze service: Not on file     Active member of club or organization: Not on file     Attends meetings of clubs or organizations: Not on file     Relationship status: Not on file    Intimate partner violence:     Fear of current or ex partner: Not on file     Emotionally abused: Not on file     Physically abused: Not on file     Forced sexual activity: Not on file   Other Topics Concern    Not on file   Social History Narrative    Not on file       Alcohol history: Yes, 1 beer or glass of wine once a week on weekdays, 1 beer or glass of wine  on weekends of . Smoking history: Former smoker, smoked 0.10 ppd x 40 years, quit 5 years ago  Illicit drug history: smokes marijuana occasionally     Living arrangement: patient lives with their spouse. Ambulates: Independently     Physical Exam  Visit Vitals  /79 (BP 1 Location: Left arm, BP Patient Position: Sitting)   Pulse 71   Temp 98.3 °F (36.8 °C)   Resp 18   Ht 6' 1\" (1.854 m)   Wt 235 lb (106.6 kg)   SpO2 100%   BMI 31.00 kg/m²       General: No acute distress. Alert. Cooperative. Head: Normocephalic. Atraumatic. Eyes:              Conjunctiva pink. Sclera white, mildly icteric (at baseline per family). PERRL. Ears:              Ear canals patent. TM non-erythematous. Nose:             Septum midline. Mucosa pink. No drainage. Throat: Mucosa pink. Moist mucous membranes. No tonsillar exudates or erythema. Palate movement equal bilaterally. Neck: Supple. Normal ROM. No stiffness. Respiratory: CTAB. No w/r/r/c.   Cardiovascular: RRR. Normal S1,S2. No m/r/g. Pulses 2+ throughout. GI: + bowel sounds. RUQ, epigastric tenderness to palpation . No rebound tenderness or guarding. Nondistended. Negative Zee's sign. Musculoskeletal: Full ROM in all extremities. No LE edema. Distal pulses intact. Skin: Warm, dry. No rashes. Neuro: CN II-XII grossly intact. Strength 5/5 in all extremities.         Laboratory Data  Recent Results (from the past 8 hour(s))   CBC WITH AUTOMATED DIFF Collection Time: 03/25/19  2:11 PM   Result Value Ref Range    WBC 5.3 4.1 - 11.1 K/uL    RBC 5.38 4.10 - 5.70 M/uL    HGB 16.0 12.1 - 17.0 g/dL    HCT 48.2 36.6 - 50.3 %    MCV 89.6 80.0 - 99.0 FL    MCH 29.7 26.0 - 34.0 PG    MCHC 33.2 30.0 - 36.5 g/dL    RDW 13.5 11.5 - 14.5 %    PLATELET 647 178 - 032 K/uL    MPV 12.0 8.9 - 12.9 FL    NRBC 0.0 0  WBC    ABSOLUTE NRBC 0.00 0.00 - 0.01 K/uL    NEUTROPHILS 65 32 - 75 %    LYMPHOCYTES 27 12 - 49 %    MONOCYTES 8 5 - 13 %    EOSINOPHILS 0 0 - 7 %    BASOPHILS 0 0 - 1 %    IMMATURE GRANULOCYTES 0 0.0 - 0.5 %    ABS. NEUTROPHILS 3.5 1.8 - 8.0 K/UL    ABS. LYMPHOCYTES 1.4 0.8 - 3.5 K/UL    ABS. MONOCYTES 0.4 0.0 - 1.0 K/UL    ABS. EOSINOPHILS 0.0 0.0 - 0.4 K/UL    ABS. BASOPHILS 0.0 0.0 - 0.1 K/UL    ABS. IMM. GRANS. 0.0 0.00 - 0.04 K/UL    DF AUTOMATED     METABOLIC PANEL, COMPREHENSIVE    Collection Time: 03/25/19  2:11 PM   Result Value Ref Range    Sodium 138 136 - 145 mmol/L    Potassium 3.1 (L) 3.5 - 5.1 mmol/L    Chloride 102 97 - 108 mmol/L    CO2 29 21 - 32 mmol/L    Anion gap 7 5 - 15 mmol/L    Glucose 135 (H) 65 - 100 mg/dL    BUN 17 6 - 20 MG/DL    Creatinine 1.05 0.70 - 1.30 MG/DL    BUN/Creatinine ratio 16 12 - 20      GFR est AA >60 >60 ml/min/1.73m2    GFR est non-AA >60 >60 ml/min/1.73m2    Calcium 9.1 8.5 - 10.1 MG/DL    Bilirubin, total 2.8 (H) 0.2 - 1.0 MG/DL    ALT (SGPT) 391 (H) 12 - 78 U/L    AST (SGOT) 747 (H) 15 - 37 U/L    Alk. phosphatase 146 (H) 45 - 117 U/L    Protein, total 8.1 6.4 - 8.2 g/dL    Albumin 4.2 3.5 - 5.0 g/dL    Globulin 3.9 2.0 - 4.0 g/dL    A-G Ratio 1.1 1.1 - 2.2     LIPASE    Collection Time: 03/25/19  2:11 PM   Result Value Ref Range    Lipase 212 73 - 393 U/L       Imaging  CXR Results  (Last 48 hours)    None        CT Results  (Last 48 hours)               03/25/19 1530  CT ABD PELV W CONT Final result    Impression:  IMPRESSION:   Gallbladder sludge ball versus mass versus polyp.  Mild intra and extrahepatic biliary dilatation. Ultrasound recommended. Narrative:  EXAM: CT ABD PELV W CONT       INDICATION: epigastric pain radiating to back . Recurrent. COMPARISON: None        CONTRAST: 100 mL of Isovue-370. TECHNIQUE:    Following the uneventful intravenous administration of contrast, thin axial   images were obtained through the abdomen and pelvis. Coronal and sagittal   reconstructions were generated. Oral contrast was not administered. CT dose   reduction was achieved through use of a standardized protocol tailored for this   examination and automatic exposure control for dose modulation. FINDINGS:    LUNG BASES: Clear. INCIDENTALLY IMAGED HEART AND MEDIASTINUM: Unremarkable. LIVER: No mass or biliary dilatation. GALLBLADDER: 17 mm filling defect in the gallbladder dependently (image 30). CBD   measures 9.5 mm and there is minimal intrahepatic biliary dilatation. SPLEEN: No mass. PANCREAS: No mass or ductal dilatation. ADRENALS: Unremarkable. KIDNEYS: No mass, calculus, or hydronephrosis. STOMACH: Unremarkable. SMALL BOWEL: No dilatation or wall thickening. COLON: No dilatation or wall thickening. APPENDIX: Not visualized. PERITONEUM: No ascites or pneumoperitoneum. RETROPERITONEUM: No lymphadenopathy or aortic aneurysm. REPRODUCTIVE ORGANS: Small prostate   URINARY BLADDER: No mass or calculus. BONES: No destructive bone lesion. ADDITIONAL COMMENTS: N/A                   Assessment and Plan     Azalia Maxwell is a 66 y.o. male who is admitted for Abdominal Pain. RUQ Abdominal pain- Likely secondary to Biliary colic vs choledocolithiasis vs hepatitis. Patient with RUQ pain and elevated LFTs on admission. Patient is afebrile and no leukocytosis on admission making cholangitis and cholecystitis unlikely. On admission CT abd: Gallbladder sludge ball versus mass versus polyp. Mild intra and extrahepatic biliary dilatation.  Abd US Cholelithiasis with mild extrahepatic biliary dilatation. Common bile duct stone not demonstrated with ultrasound technique. - Admit to telemetry  - NPO at midnight  - Consider MRCP in the am  - F/u Hepatitis panel, EDWIN, Smooth muscle antibody, mitochondrial ab, liver-kidney microsomal ab, mononucleosis screen  - GI consulted, appreciate recs      Pre Diabetes - HgA1c 5.4 (6/14/18)  - Diet controlled  - Daily CMP     Hypertension- BP on admission 153/79. At this time, 134/77.  - Continuing home medications of norvasc 5 mg tab daily, coreg 6.25 mg tab BID, enalapril 20 mg tab BID, hydrochlorothiazide 25 mg tab daily. - Will continue to monitor at this time and readjust as BP's trend. CAD- s/p stent placement 2016  - Continue home ASA 81 mg tab  - Continue home lipitor 20 mg tab qhs    Hypokalemia - POA K 3.1 s/p klor-marlen 40 meq  - daily CMP  - replete as needed    HLD - T. Chol 144, TL 62, HDL 60, LDL 72 (11/29/2017)  - Continue home lipitor 20 mg tab qhs    Obesity  - PT with BMI of Body mass index is 31 kg/m². - Encouraging lifestyle modifications and further follow up outpatient. FEN/GI - NPO after midnight. Activity - Ambulate as tolerated  DVT prophylaxis - SCDs, start lovenox after possible ERCP  GI prophylaxis - Not indicated at this time  Fall prophylaxis - Not indicated at this time. Disposition - Admit to Medical. Plan to d/c to Home. Code Status - Full, discussed with patient / caregivers.   Next of Kin Name and Contact Spouse [phone]    Patient Shikha Lamb will be discussed with Dr.Timothy Nguyen Herman.   8:48 PM, 03/25/19  Abhay Catherine MD  Family Medicine Resident       For Billing    Chief Complaint   Patient presents with    Abdominal Pain    Back Pain       Hospital Problems  Date Reviewed: 5/2/2017          Codes Class Noted POA    Abdominal pain ICD-10-CM: R10.9  ICD-9-CM: 789.00  3/25/2019 Unknown               [address] Medicine Residency Attending Addendum:   I discussed the findings, assessment and plan with the resident and agree with the resident's findings and plan as documented in the resident's note.       Yoselin Kinsey MD, CAQSM, RMSK

## 2019-03-26 NOTE — CONSULTS
Surgery Consult    Subjective:      Azalia Maxwell is a 66 y.o. black male who I was asked to see for RUQ/epigastric pain and gallstones. Earlier yesterday morning, Mr. Haley Gonzalez developed an acute onset of upper abdominal pain with radiation to his back. The pain was associated with nausea, but he denies any vomiting, fever, diarrhea, or jaundice. His stool and urine are normal in color. He denies any h/o PUD, pancreatitis, liver disease, or IBD. He denies any previous abdominal surgery. He admits to traveling to [facility] last week, but has been there before. He has had similar pain about a month ago which resolved spontaneously. His pain completely resolved yesterday in the ER with pain medication. His US revealed multiple gallstones with a slightly dilated CBD. His LFT's are elevated with an initial total bilirubin of 2.8.       Past Medical History:   Diagnosis Date    Arthritis     mainly knees, off and on    Coronary artery disease of native artery of native heart with stable angina pectoris ([facility] [address] 9/13/2016    1 stent - RCA - Dr. Alberto File DM (diabetes mellitus) ([facility] [address] 12/10/2012    pre-diabetes    ED (erectile dysfunction) 4/17/2009    HTN 4/17/2009    Hypercholesterolemia     Hyperlipemia 4/17/2009     Past Surgical History:   Procedure Laterality Date    CARDIAC SURG PROCEDURE UNLIST  09/22/2016    1 stent    HX CORONARY STENT PLACEMENT      HX HEART CATHETERIZATION      HX PTCA        Family History   Problem Relation Age of Onset    Heart Disease Mother         premature    Heart Attack Mother     Hypertension Father     Stroke Father     Hypertension Sister     Diabetes Sister     Stroke Sister     No Known Problems Brother     Diabetes Sister     Cancer Neg Hx      Social History     Socioeconomic History    Marital status:      Spouse name: Not on file    Number of children: Not on file    Years of education: Not on file    Highest education level: Not on file   Tobacco Use    Smoking status: Light Tobacco Smoker     Types: Cigars     Last attempt to quit: 2004     Years since quittin.9    Smokeless tobacco: Never Used    Tobacco comment: 2-3 times a year   Substance and Sexual Activity    Alcohol use: Yes     Alcohol/week: 0.5 oz     Types: 1 Cans of beer per week    Drug use: No    Sexual activity: Yes     Partners: Female      Current Facility-Administered Medications   Medication Dose Route Frequency Provider Last Rate Last Dose    sodium chloride (NS) flush 5-40 mL  5-40 mL IntraVENous Q8H Fred Nobles MD   10 mL at 19 0520    sodium chloride (NS) flush 5-40 mL  5-40 mL IntraVENous PRN Lucita Nobles MD        ibuprofen (MOTRIN) tablet 400 mg  400 mg Oral Q4H PRN Lucita Nobles MD        ondansetron (ZOFRAN) injection 4 mg  4 mg IntraVENous Q4H PRN Lucita Nobles MD        ketorolac (TORADOL) injection 30 mg  30 mg IntraVENous Q6H PRN Fred Nobles MD        0.9% sodium chloride infusion  75 mL/hr IntraVENous CONTINUOUS Donna Benítez MD 75 mL/hr at 19 2202 75 mL/hr at 19 2202    amLODIPine (NORVASC) tablet 5 mg  5 mg Oral DAILY Donna Benítez MD        carvedilol (COREG) tablet 6.25 mg  6.25 mg Oral BID WITH MEALS Donna Benítez MD        oxyCODONE IR (ROXICODONE) tablet 5 mg  5 mg Oral Q4H PRN Sarai Webber MD   5 mg at 19 2228        Allergies   Allergen Reactions    Nifedipine Other (comments)     Caused terrible feeling/not himself/drowsey     Quinine Itching       Review of Systems:  A comprehensive review of systems was negative except for that written in the History of Present Illness.     Objective:        Patient Vitals for the past 8 hrs:   BP Temp Pulse Resp SpO2   19 0442 147/78 98.3 °F (36.8 °C) 75 18 96 %   19 0108 134/77 98.6 °F (37 °C) 71 17 98 %       Temp (24hrs), Av.4 °F (36.9 °C), Min:98.2 °F (36.8 °C), Max:98.6 °F (37 °C)      Physical Exam:  GENERAL: alert, cooperative, no distress, appears stated age, EYE: negative findings: anicteric sclera, LYMPHATIC: Cervical, supraclavicular nodes normal. , THROAT & NECK: normal, LUNG: clear to auscultation bilaterally, HEART: regular rate and rhythm, ABDOMEN: Soft, NT, ND. There are no masses. , EXTREMITIES:  no edema, SKIN: Normal., NEUROLOGIC: negative, PSYCHIATRIC: non focal    Assessment:     Hospital Problems  Date Reviewed: 5/2/2017          Codes Class Noted POA    Abdominal pain ICD-10-CM: R10.9  ICD-9-CM: 789.00  3/25/2019 Yes        * (Principal) Elevated LFTs ICD-10-CM: R94.5  ICD-9-CM: 790.6  3/25/2019 Yes        HTN (hypertension) ICD-10-CM: I10  ICD-9-CM: 401.9  4/17/2009 Yes        Hyperlipemia ICD-10-CM: E78.5  ICD-9-CM: 272.4  4/17/2009 Yes              Plan:     Mr. Greg Mckeon most likely had a stone in his CBD causing his pain and elevated LFT's. I agree with proceeding with the MRCP. Hopefully, the stone has passed since his bilirubin has gone down this AM.  If so, he will be scheduled for a lap josh tomorrow. If stones are still present, then I would have GI do an ERCP prior to his lap josh. The plan of care was discussed with Mr. Greg Mckeon, and all questions were answered. I appreciate the medical service allowing me to participate in Mr. Paul's care.     Signed By: Grisel Navarrete MD     March 26, 2019

## 2019-03-26 NOTE — CONSULTS
Full note as per Dr. Lieutenant Avila. Pt is actually a family practice resident pt.  They will take over the care of this patient in the AM

## 2019-03-26 NOTE — PROGRESS NOTES
Bedside and Verbal shift change report given to [facility] [address] [place] [address] (oncoming nurse) by Isis Ca (offgoing nurse). Report included the following information SBAR, Kardex and Recent Results.

## 2019-03-26 NOTE — CONSULTS
Gastroenterology Consultation Note      Admit Date: 3/25/2019  Consult Date: 3/26/2019   I greatly appreciate your asking me to see Zay Kline, thank you very much for the opportunity to participate in his care. Narrative Assessment and Plan   · Abdominal pain  · Abnormal liver enzymes  · Hyperbilirubinemia  · Cholelithiasis with biliary dilation on imaging    66yo male presents with complaints of upper abdominal pain which has now resolved. His labs and imaging suggest possibility of choledocholithiasis. Will proceed with MRCP to further evaluate. If MRCP positive he will need ERCP. If MRCP negative recommend proceeding with lap cholecystectomy (timing per surgery). Attending attestation (Dr. Drew Franco) to follow    12:55 PM  Addendum: MRCP results 1. Interval resolution of intrahepatic biliary dilatation and common bile duct dilatation. Common bile duct now tapers normally at the level of the ampulla,  suggesting interval passage of a distal common bile duct calculus. 2. Cholelithiasis, mild gallbladder distention and trace pericholecystic fluid. No need for ERCP. Timing of cholecystectomy per surgeon. Subjective:     Chief Complaint: abdominal pain,     History of Present Illness: Zay Kline is a 66 y.o. male who presents with complaints of abdominal pain. Patient describes a sudden onset of cramping, upper abdominal pain. He had similar pain 1 month ago but it resolved within a few hours. This time the pain was persistent. Pain radiated to his back. No aggravating or alleviating factors. Denies associated fever, chills, nausea, vomiting or change in bowel habits. Drinks alcohol about once per week. Recent travel to [place] but states he was fine while traveling. Was treated for flu last week. In ED: Tbili 2.8, , , lipase normal. CT scan revealed dilated CBD and mild intrahepatic and extrahepatic biliary dilation.  Abdominal US showed cholelithiasis with mild extrahepatic biliary dilation. This AM patient is asymptomatic. Tbili and liver enzymes improving. PCP:  Prateek Cast MD    Past Medical History:   Diagnosis Date    Arthritis     mainly knees, off and on    Coronary artery disease of native artery of native heart with stable angina pectoris [facility]    1 stent - RCA - Dr. Georges Courser DM (diabetes mellitus) ([facility] 12/10/2012    pre-diabetes    ED (erectile dysfunction) 2009    HTN 2009    Hypercholesterolemia     Hyperlipemia 2009        Past Surgical History:   Procedure Laterality Date    CARDIAC SURG PROCEDURE UNLIST  2016    1 stent    HX CORONARY STENT PLACEMENT      HX HEART CATHETERIZATION      HX PTCA         Social History     Tobacco Use    Smoking status: Light Tobacco Smoker     Types: Cigars     Last attempt to quit: 2004     Years since quittin.9    Smokeless tobacco: Never Used    Tobacco comment: 2-3 times a year   Substance Use Topics    Alcohol use: Yes     Alcohol/week: 0.5 oz     Types: 1 Cans of beer per week        Family History   Problem Relation Age of Onset    Heart Disease Mother         premature    Heart Attack Mother     Hypertension Father    [de-identified] Stroke Father     Hypertension Sister     Diabetes Sister     Stroke Sister     No Known Problems Brother     Diabetes Sister     Cancer Neg Hx         Allergies   Allergen Reactions    Nifedipine Other (comments)     Caused terrible feeling/not himself/drowsey     Quinine Itching            Home Medications:  Prior to Admission Medications   Prescriptions Last Dose Informant Patient Reported? Taking? amLODIPine (NORVASC) 5 mg tablet 3/25/2019  No Yes   Sig: TAKE 1 TABLET BY MOUTH EVERY DAY   aspirin delayed-release 81 mg tablet 3/25/2019  Yes Yes   Sig: Take 81 mg by mouth daily. atorvastatin (LIPITOR) 20 mg tablet 3/25/2019  No Yes   Sig: Take 1 Tab by mouth daily.    carvedilol (COREG) 6.25 mg tablet 3/25/2019  No Yes   Sig: TAKE 1 TABLET BY MOUTH TWICE A DAY WITH FOOD   enalapril (VASOTEC) 20 mg tablet 3/25/2019  No Yes   Sig: TAKE 1 TABLET BY MOUTH TWICE A DAY **NEED APPT**   hydroCHLOROthiazide (HYDRODIURIL) 25 mg tablet 3/25/2019  No Yes   Sig: Take 1 Tab by mouth daily. naproxen sodium (ALEVE) 220 mg tablet 3/25/2019  Yes Yes   Sig: Take 220 mg by mouth two (2) times daily as needed for Pain.       Facility-Administered Medications: None       Hospital Medications:  Current Facility-Administered Medications   Medication Dose Route Frequency    potassium chloride 10 mEq in 100 ml IVPB  10 mEq IntraVENous Q1H    sodium chloride (NS) flush 5-40 mL  5-40 mL IntraVENous Q8H    sodium chloride (NS) flush 5-40 mL  5-40 mL IntraVENous PRN    ibuprofen (MOTRIN) tablet 400 mg  400 mg Oral Q4H PRN    ondansetron (ZOFRAN) injection 4 mg  4 mg IntraVENous Q4H PRN    ketorolac (TORADOL) injection 30 mg  30 mg IntraVENous Q6H PRN    0.9% sodium chloride infusion  75 mL/hr IntraVENous CONTINUOUS    amLODIPine (NORVASC) tablet 5 mg  5 mg Oral DAILY    carvedilol (COREG) tablet 6.25 mg  6.25 mg Oral BID WITH MEALS    oxyCODONE IR (ROXICODONE) tablet 5 mg  5 mg Oral Q4H PRN       Review of Systems: Admission ROS by She Gaona MD from 3/25/2019 were reviewed with the patient and changes (other than per HPI) include: none      Objective:     Physical Exam:  Visit Vitals  /85 (BP 1 Location: Right arm, BP Patient Position: At rest)   Pulse 62   Temp 98.9 °F (37.2 °C)   Resp 18   Ht 6' 1\" (1.854 m)   Wt 106.6 kg (235 lb)   SpO2 97%   BMI 31.00 kg/m²     SpO2 Readings from Last 6 Encounters:   03/26/19 97%   02/20/19 100%   06/14/18 97%   11/29/17 99%   05/02/17 98%   11/01/16 98%            Intake/Output Summary (Last 24 hours) at 3/26/2019 0909  Last data filed at 3/26/2019 2059  Gross per 24 hour   Intake 1706.25 ml   Output --   Net 1706.25 ml      General: no distress, comfortable  Skin:  No rash or jaundice  HEENT: Pupils equal, sclera anicteric, oropharynx with no gross lesions  Cardiovascular: No abnormal audible heart sounds, well perfused, no edema  Respiratory:  No abnormal audible breath sounds, normal respiratory effort, no throacic deformity  GI:  Soft, nondistended, nontender   Musculoskeletal:  No skeletal deformity nor acute arthritis noted. Neurological:  CN II-XII grossly intact, no focal deficits  Psychiatric:  Normal affect, memory intact, appears to have insight into current illness  Lymphatic:  No periumbilic lymphadenopathy    Laboratory:    Recent Results (from the past 24 hour(s))   CBC WITH AUTOMATED DIFF    Collection Time: 03/25/19  2:11 PM   Result Value Ref Range    WBC 5.3 4.1 - 11.1 K/uL    RBC 5.38 4.10 - 5.70 M/uL    HGB 16.0 12.1 - 17.0 g/dL    HCT 48.2 36.6 - 50.3 %    MCV 89.6 80.0 - 99.0 FL    MCH 29.7 26.0 - 34.0 PG    MCHC 33.2 30.0 - 36.5 g/dL    RDW 13.5 11.5 - 14.5 %    PLATELET 993 893 - 360 K/uL    MPV 12.0 8.9 - 12.9 FL    NRBC 0.0 0  WBC    ABSOLUTE NRBC 0.00 0.00 - 0.01 K/uL    NEUTROPHILS 65 32 - 75 %    LYMPHOCYTES 27 12 - 49 %    MONOCYTES 8 5 - 13 %    EOSINOPHILS 0 0 - 7 %    BASOPHILS 0 0 - 1 %    IMMATURE GRANULOCYTES 0 0.0 - 0.5 %    ABS. NEUTROPHILS 3.5 1.8 - 8.0 K/UL    ABS. LYMPHOCYTES 1.4 0.8 - 3.5 K/UL    ABS. MONOCYTES 0.4 0.0 - 1.0 K/UL    ABS. EOSINOPHILS 0.0 0.0 - 0.4 K/UL    ABS. BASOPHILS 0.0 0.0 - 0.1 K/UL    ABS. IMM.  GRANS. 0.0 0.00 - 0.04 K/UL    DF AUTOMATED     METABOLIC PANEL, COMPREHENSIVE    Collection Time: 03/25/19  2:11 PM   Result Value Ref Range    Sodium 138 136 - 145 mmol/L    Potassium 3.1 (L) 3.5 - 5.1 mmol/L    Chloride 102 97 - 108 mmol/L    CO2 29 21 - 32 mmol/L    Anion gap 7 5 - 15 mmol/L    Glucose 135 (H) 65 - 100 mg/dL    BUN 17 6 - 20 MG/DL    Creatinine 1.05 0.70 - 1.30 MG/DL    BUN/Creatinine ratio 16 12 - 20      GFR est AA >60 >60 ml/min/1.73m2    GFR est non-AA >60 >60 ml/min/1.73m2    Calcium 9.1 8.5 - 10.1 MG/DL    Bilirubin, total 2.8 (H) 0.2 - 1.0 MG/DL    ALT (SGPT) 391 (H) 12 - 78 U/L    AST (SGOT) 747 (H) 15 - 37 U/L    Alk. phosphatase 146 (H) 45 - 117 U/L    Protein, total 8.1 6.4 - 8.2 g/dL    Albumin 4.2 3.5 - 5.0 g/dL    Globulin 3.9 2.0 - 4.0 g/dL    A-G Ratio 1.1 1.1 - 2.2     LIPASE    Collection Time: 03/25/19  2:11 PM   Result Value Ref Range    Lipase 212 73 - 393 U/L   PROTHROMBIN TIME + INR    Collection Time: 03/25/19  9:53 PM   Result Value Ref Range    INR 1.0 0.9 - 1.1      Prothrombin time 10.6 9.0 - 11.1 sec   MONONUCLEOSIS SCREEN    Collection Time: 03/25/19  9:53 PM   Result Value Ref Range    Mononucleosis screen NEGATIVE  NEG     CBC W/O DIFF    Collection Time: 03/26/19  6:28 AM   Result Value Ref Range    WBC 5.3 4.1 - 11.1 K/uL    RBC 4.76 4.10 - 5.70 M/uL    HGB 14.5 12.1 - 17.0 g/dL    HCT 42.8 36.6 - 50.3 %    MCV 89.9 80.0 - 99.0 FL    MCH 30.5 26.0 - 34.0 PG    MCHC 33.9 30.0 - 36.5 g/dL    RDW 13.4 11.5 - 14.5 %    PLATELET 035 878 - 819 K/uL    MPV 11.5 8.9 - 12.9 FL    NRBC 0.0 0  WBC    ABSOLUTE NRBC 0.00 0.00 - 1.72 K/uL   METABOLIC PANEL, BASIC    Collection Time: 03/26/19  6:28 AM   Result Value Ref Range    Sodium 139 136 - 145 mmol/L    Potassium 3.0 (L) 3.5 - 5.1 mmol/L    Chloride 104 97 - 108 mmol/L    CO2 26 21 - 32 mmol/L    Anion gap 9 5 - 15 mmol/L    Glucose 98 65 - 100 mg/dL    BUN 11 6 - 20 MG/DL    Creatinine 0.86 0.70 - 1.30 MG/DL    BUN/Creatinine ratio 13 12 - 20      GFR est AA >60 >60 ml/min/1.73m2    GFR est non-AA >60 >60 ml/min/1.73m2    Calcium 8.7 8.5 - 10.1 MG/DL   HEPATIC FUNCTION PANEL    Collection Time: 03/26/19  6:28 AM   Result Value Ref Range    Protein, total 6.8 6.4 - 8.2 g/dL    Albumin 3.5 3.5 - 5.0 g/dL    Globulin 3.3 2.0 - 4.0 g/dL    A-G Ratio 1.1 1.1 - 2.2      Bilirubin, total 1.7 (H) 0.2 - 1.0 MG/DL    Bilirubin, direct 0.4 (H) 0.0 - 0.2 MG/DL    Alk.  phosphatase 142 (H) 45 - 117 U/L    AST (SGOT) 285 (H) 15 - 37 U/L    ALT (SGPT) 404 (H) 12 - 78 U/L LIPASE    Collection Time: 03/26/19  6:28 AM   Result Value Ref Range    Lipase 131 73 - 393 U/L     US Results (most recent):  Results from [place] encounter on 03/25/19   US ABD LTD    Narrative EXAM:  ABDOMEN, LTD - US*     INDICATION: Right upper quadrant pain. COMPARISON: CT from earlier today. .    TECHNIQUE:   Limited real-time sonography of the right upper quadrant of the abdomen was  performed with multiple static images of the liver, gallbladder, pancreatic head  and right kidney obtained. FINDINGS:  LIVER:   The liver is normal in echotexture with no mass or other focal abnormality. The  portal vein flow is hepatopedal.    GALLBLADDER:  The gallbladder contains multiple stones. . There is no wall thickening or fluid  around the gallbladder. COMMON BILE DUCT:  There is no intrahepatic biliary duct dilatation but the common duct measures 8  mm in diameter. PANCREAS:  The pancreatic head is obscured by bowel gas. KIDNEYS:  The right kidney demonstrates normal echogenicity with no mass, stone or  hydronephrosis. The right kidney measures 11.4 cm in length. The body and tail of the pancreas, left kidney, spleen and retroperitoneum were  not evaluated on this right upper quadrant examination. Impression IMPRESSION:    Cholelithiasis with mild extrahepatic biliary dilatation  Common bile duct stone not demonstrated with ultrasound technique. Intrahepatic biliary dilatation not as evident on ultrasound. CT Results (most recent):  Results from Hospital Encounter encounter on 03/25/19   CT ABD PELV W CONT    Narrative EXAM: CT ABD PELV W CONT    INDICATION: epigastric pain radiating to back . Recurrent. COMPARISON: None     CONTRAST: 100 mL of Isovue-370. TECHNIQUE:   Following the uneventful intravenous administration of contrast, thin axial  images were obtained through the abdomen and pelvis. Coronal and sagittal  reconstructions were generated.  Oral contrast was not administered. CT dose  reduction was achieved through use of a standardized protocol tailored for this  examination and automatic exposure control for dose modulation. FINDINGS:   LUNG BASES: Clear. INCIDENTALLY IMAGED HEART AND MEDIASTINUM: Unremarkable. LIVER: No mass or biliary dilatation. GALLBLADDER: 17 mm filling defect in the gallbladder dependently (image 30). CBD  measures 9.5 mm and there is minimal intrahepatic biliary dilatation. SPLEEN: No mass. PANCREAS: No mass or ductal dilatation. ADRENALS: Unremarkable. KIDNEYS: No mass, calculus, or hydronephrosis. STOMACH: Unremarkable. SMALL BOWEL: No dilatation or wall thickening. COLON: No dilatation or wall thickening. APPENDIX: Not visualized. PERITONEUM: No ascites or pneumoperitoneum. RETROPERITONEUM: No lymphadenopathy or aortic aneurysm. REPRODUCTIVE ORGANS: Small prostate  URINARY BLADDER: No mass or calculus. BONES: No destructive bone lesion. ADDITIONAL COMMENTS: N/A      Impression IMPRESSION:  Gallbladder sludge ball versus mass versus polyp. Mild intra and extrahepatic  biliary dilatation. Ultrasound recommended. Assessment/Plan:     Principal Problem:    Elevated LFTs (3/25/2019)    Active Problems:    HTN (hypertension) (4/17/2009)      Hyperlipemia (4/17/2009)      Abdominal pain (3/25/2019)      Symptomatic cholelithiasis (3/26/2019)         See above narrative for full detail. Axel Bateman PA-C  03/26/19  9:09 AM    Will at least need to track liver enzymes as an outpatient; should they return to normal than no additional further evaluation would be needed  Screening for Hep can be be accomplished now.     I have interviewed and examined patient with addendum to note above and formulation care plan to reflect my evaluation    Zack Sims M.D.

## 2019-03-26 NOTE — PROGRESS NOTES
[facility]   Senior Resident Admission Note    CC: RUQ abd. pain    HPI:  Mac Koch is a 66 y.o. male who presents to the ER complaining of RUQ pain, denies any nasuea,vomiting, fevers. Chart reviewed. Pt seen and discussed with Dr. Haley Flaherty. See her note for more details. Patient initially admitted by hospitalist.  They placed admission orders and wrote initial H&P. Sign out given to resident team by night hospitalist.    3/25/2019    Vital Signs  Blood pressure 153/79, pulse 71, temperature 98.3 °F (36.8 °C), resp. rate 18, height 6' 1\" (1.854 m), weight 235 lb (106.6 kg), SpO2 100 %. Recent Labs     03/25/19  1411   WBC 5.3   HGB 16.0   HCT 48.2         K 3.1*      CO2 29   *   BUN 17   CREA 1.05   CA 9.1   ALB 4.2   TBILI 2.8*   SGOT 747*   *     Imaging  Ct Abd Pelv W Cont    Result Date: 3/25/2019  IMPRESSION: Gallbladder sludge ball versus mass versus polyp. Mild intra and extrahepatic biliary dilatation. Ultrasound recommended. [address] [place] [address]    Result Date: 3/25/2019  IMPRESSION: Cholelithiasis with mild extrahepatic biliary dilatation Common bile duct stone not demonstrated with ultrasound technique. Intrahepatic biliary dilatation not as evident on ultrasound. A/P: Pt is a 66 y.o. male who presented to ED with RUQ pain. Differential includes gallbladder sludge vs cholelithiasis vs acute hepatitis vs other liver pathology    1. RUQ Pain- Elevated LFTs (AST:ALT ratio of 2:1), elevated total bili. CT shows gallbladder sludge, and U/S shows choledocholithiasis. Will do further workup with GGT, hepatitis panel, and consult GI in the morning for possible ERCP  2. Pre-diabetes- Watch sugars with AM labs, diabetic diet  3. HTN- Cont home medications  4. CAD s/p 1 stent- Stable cont home meds (coreg)  5. HLD- Cont home atorvastatin    I agree with remaining assessment and plan as documented in Dr. An Hanna note.       Pt discussed with Dr. Sarai Bowne (on-call attending physician)    Addendum 9:21 PM:  Added on monospot and autoimmune hepatitis workup. MELD score pending INR value.     Dianelys Melo MD  Family Medicine Resident

## 2019-03-27 VITALS
WEIGHT: 235 LBS | RESPIRATION RATE: 18 BRPM | SYSTOLIC BLOOD PRESSURE: 165 MMHG | HEIGHT: 73 IN | HEART RATE: 80 BPM | BODY MASS INDEX: 31.14 KG/M2 | TEMPERATURE: 98.1 F | DIASTOLIC BLOOD PRESSURE: 98 MMHG | OXYGEN SATURATION: 100 %

## 2019-03-27 LAB
ACTIN IGG SERPL-ACNC: 11 UNITS (ref 0–19)
ALBUMIN SERPL-MCNC: 3.9 G/DL (ref 3.5–5)
ALBUMIN/GLOB SERPL: 1.1 {RATIO} (ref 1.1–2.2)
ALP SERPL-CCNC: 125 U/L (ref 45–117)
ALT SERPL-CCNC: 261 U/L (ref 12–78)
ANA SER QL: NEGATIVE
ANION GAP SERPL CALC-SCNC: 5 MMOL/L (ref 5–15)
AST SERPL-CCNC: 86 U/L (ref 15–37)
BILIRUB SERPL-MCNC: 1.2 MG/DL (ref 0.2–1)
BUN SERPL-MCNC: 10 MG/DL (ref 6–20)
BUN/CREAT SERPL: 11 (ref 12–20)
CALCIUM SERPL-MCNC: 8.8 MG/DL (ref 8.5–10.1)
CHLORIDE SERPL-SCNC: 105 MMOL/L (ref 97–108)
CO2 SERPL-SCNC: 28 MMOL/L (ref 21–32)
CREAT SERPL-MCNC: 0.88 MG/DL (ref 0.7–1.3)
ERYTHROCYTE [DISTWIDTH] IN BLOOD BY AUTOMATED COUNT: 13.5 % (ref 11.5–14.5)
GLOBULIN SER CALC-MCNC: 3.5 G/DL (ref 2–4)
GLUCOSE SERPL-MCNC: 104 MG/DL (ref 65–100)
HCT VFR BLD AUTO: 44.7 % (ref 36.6–50.3)
HGB BLD-MCNC: 14.8 G/DL (ref 12.1–17)
LKM-1 AB SER-ACNC: 3.8 UNITS (ref 0–20)
MCH RBC QN AUTO: 30 PG (ref 26–34)
MCHC RBC AUTO-ENTMCNC: 33.1 G/DL (ref 30–36.5)
MCV RBC AUTO: 90.5 FL (ref 80–99)
MITOCHONDRIA M2 IGG SER-ACNC: <20 UNITS (ref 0–20)
NRBC # BLD: 0 K/UL (ref 0–0.01)
NRBC BLD-RTO: 0 PER 100 WBC
PLATELET # BLD AUTO: 244 K/UL (ref 150–400)
PMV BLD AUTO: 11.2 FL (ref 8.9–12.9)
POTASSIUM SERPL-SCNC: 3.3 MMOL/L (ref 3.5–5.1)
PROT SERPL-MCNC: 7.4 G/DL (ref 6.4–8.2)
RBC # BLD AUTO: 4.94 M/UL (ref 4.1–5.7)
SODIUM SERPL-SCNC: 138 MMOL/L (ref 136–145)
WBC # BLD AUTO: 6.2 K/UL (ref 4.1–11.1)

## 2019-03-27 PROCEDURE — 99218 HC RM OBSERVATION: CPT

## 2019-03-27 PROCEDURE — 74011250637 HC RX REV CODE- 250/637: Performed by: INTERNAL MEDICINE

## 2019-03-27 PROCEDURE — 74011250637 HC RX REV CODE- 250/637: Performed by: STUDENT IN AN ORGANIZED HEALTH CARE EDUCATION/TRAINING PROGRAM

## 2019-03-27 PROCEDURE — 85027 COMPLETE CBC AUTOMATED: CPT

## 2019-03-27 PROCEDURE — 80053 COMPREHEN METABOLIC PANEL: CPT

## 2019-03-27 PROCEDURE — 36415 COLL VENOUS BLD VENIPUNCTURE: CPT

## 2019-03-27 PROCEDURE — 65270000029 HC RM PRIVATE

## 2019-03-27 RX ORDER — LISINOPRIL 5 MG/1
20 TABLET ORAL 2 TIMES DAILY
Status: DISCONTINUED | OUTPATIENT
Start: 2019-03-27 | End: 2019-03-27 | Stop reason: HOSPADM

## 2019-03-27 RX ORDER — POTASSIUM CHLORIDE 750 MG/1
40 TABLET, FILM COATED, EXTENDED RELEASE ORAL
Status: COMPLETED | OUTPATIENT
Start: 2019-03-27 | End: 2019-03-27

## 2019-03-27 RX ORDER — ATORVASTATIN CALCIUM 20 MG/1
20 TABLET, FILM COATED ORAL DAILY
Status: DISCONTINUED | OUTPATIENT
Start: 2019-03-27 | End: 2019-03-27 | Stop reason: HOSPADM

## 2019-03-27 RX ORDER — HYDROCHLOROTHIAZIDE 25 MG/1
25 TABLET ORAL DAILY
Status: DISCONTINUED | OUTPATIENT
Start: 2019-03-27 | End: 2019-03-27 | Stop reason: HOSPADM

## 2019-03-27 RX ADMIN — AMLODIPINE BESYLATE 5 MG: 5 TABLET ORAL at 08:16

## 2019-03-27 RX ADMIN — Medication 10 ML: at 05:57

## 2019-03-27 RX ADMIN — ATORVASTATIN CALCIUM 20 MG: 20 TABLET, FILM COATED ORAL at 08:16

## 2019-03-27 RX ADMIN — HYDROCHLOROTHIAZIDE 25 MG: 25 TABLET ORAL at 08:16

## 2019-03-27 RX ADMIN — POTASSIUM CHLORIDE 40 MEQ: 750 TABLET, EXTENDED RELEASE ORAL at 08:15

## 2019-03-27 RX ADMIN — CARVEDILOL 6.25 MG: 3.12 TABLET, FILM COATED ORAL at 08:16

## 2019-03-27 RX ADMIN — LISINOPRIL 20 MG: 5 TABLET ORAL at 08:15

## 2019-03-27 NOTE — PROGRESS NOTES
Patient given discharge instructions. Patient verbalizes understanding of discharge instructions and follow up. PIV removed. Patient refused wheelchair ride out of unit. Patient ambulated out of unit in stable condition, family driving patient to home.

## 2019-03-27 NOTE — DISCHARGE SUMMARY
[address] [facility] [address] [place] [address]   Office [phone], Fax [phone]    Discharge Summary     Patient: Alicia Mchugh       MRN: [id number]       YOB: 1960       Age: 66 y.o. Date of admission:  3/25/2019    Date of discharge:  3/27/2019    Primary care provider:  Ralph Horowitz MD     Admitting provider:  Aleena Whiteside MD    Discharging provider(s): Long Munoz MD - Family Medicine Resident  Gm Mujica MD - Family Medicine Attending     Consultations  · Gastroenterology  · General Surgery     Procedures  · NONE    Discharge destination: Home with [address] [place] [address]. The patient is stable for discharge. Admission diagnosis  Abdominal pain [R10.9]  Abdominal pain [R10.9]    FOR PCP FOLLOW-UP:  - CMP for K+, LFTs, B Tili  - Routine Lipid panel  -  Review results for EDWIN, Smooth muscle antibody, mitochondrial ab, liver-kidney microsomal ab     MEDICATION CHANGES: NONE    Presentation:  HPI as per admitting provider: \"Mr. Lillie Blackburn is a 66 y.o.   afican [place]  male who is admitted with  Abdominal pain. Mr. Lillie Blackburn presented to the Emergency Department today complaining of  Right sided abdominal pain which has been going on for the past day or so, no nausea , no vomiting, no fever, no chills, no diarrhea, no constipation, He recently has been battling the flu as per patient and recently returned from [facility] where he was for his sisters , he had similar complain in the past but it resolved with advil,In the Ed he was found to have elevated LFT, ulysses of about 2.1, he denies any regular alcohol use, hw appeared very comfortable, and in no acute distress, even though he complained of pain he is in no distressed, vitals in the Ed stable, he received iv pain meds\"    Brief Hospital Course By Problem:  Abdominal Pain due to biliary colic: Abd US + cholelithiasis with negative MRCP however pt history and description of pain is c/w passing a stone.  Patient planned for lap josh during admission then declining on day of surgery with preference to wait to schedule surgery as an outpatient. Discussed with the patient the risks should another stone pass and cause obstruction.   - Outpatient follow-up with general surgery for planned lap josh  - EDWIN, Smooth muscle antibody, mitochondrial ab, liver-kidney microsomal ab pending     Pre-Diabetes: HgA1c 5.4 (6/14/18)  - Continue routine A1c screening     Hypertension: Stable on home regimen as below  - Continue norvasc 5 mg tab daily  - Continue coreg 6.25 mg tab BID  - Continue enalapril 20 mg tab BID  - Continue hydrochlorothiazide 25 mg tab daily.      CAD: s/p stent placement 2016  - Continue ASA 81 mg tab  - Continue lipitor 20 mg tab qhs     Hypokalemia: POA K 3.1 then stable during admission with as needed repletions  - BMP at PCP followup     HLD: T. Chol 144, TL 62, HDL 60, LDL 72 (11/29/2017)  - Continue lipitor 20 mg tab QHS  - Consider repeat lipid panel with PCP    Final Discharge Diagnosis: Cholelithiasis     Pending test results: At the time of your discharge the following test results are still pending: EDWIN, Smooth muscle antibody, mitochondrial ab, liver-kidney microsomal antibody  Please make sure you review these results with your outpatient follow-up provider(s). Specific symptoms to watch for: chest pain, shortness of breath, fever, chills, nausea, vomiting, diarrhea, change in mentation, falling, weakness, bleeding. DIET/what to eat:  Low fat, Low cholesterol    ACTIVITY:  Activity as tolerated    Wound care: NONE    Equipment needed:  NONE    Follow-up Care:    Follow-up Information     Follow up With Specialties Details Why Contact Info    Arnulfo Hussein MD Family Practice Go on 4/4/2019 hospital followup @ 12:05pm [address]      Christina Perkins MD Surgery Schedule an appointment as soon as possible for a visit to discuss outpatient laproscopic cholecystectomy  [address] [facility]  [address] [place] [address] [address] [place] [address], [facility], MD [facility]   [facility]  [phone]              Recent Results (from the past 24 hour(s))   CBC W/O DIFF    Collection Time: 03/27/19  5:47 AM   Result Value Ref Range    WBC 6.2 4.1 - 11.1 K/uL    RBC 4.94 4.10 - 5.70 M/uL    HGB 14.8 12.1 - 17.0 g/dL    HCT 44.7 36.6 - 50.3 %    MCV 90.5 80.0 - 99.0 FL    MCH 30.0 26.0 - 34.0 PG    MCHC 33.1 30.0 - 36.5 g/dL    RDW 13.5 11.5 - 14.5 %    PLATELET 707 240 - 483 K/uL    MPV 11.2 8.9 - 12.9 FL    NRBC 0.0 0  WBC    ABSOLUTE NRBC 0.00 0.00 - 7.57 K/uL   METABOLIC PANEL, COMPREHENSIVE    Collection Time: 03/27/19  5:47 AM   Result Value Ref Range    Sodium 138 136 - 145 mmol/L    Potassium 3.3 (L) 3.5 - 5.1 mmol/L    Chloride 105 97 - 108 mmol/L    CO2 28 21 - 32 mmol/L    Anion gap 5 5 - 15 mmol/L    Glucose 104 (H) 65 - 100 mg/dL    BUN 10 6 - 20 MG/DL    Creatinine 0.88 0.70 - 1.30 MG/DL    BUN/Creatinine ratio 11 (L) 12 - 20      GFR est AA >60 >60 ml/min/1.73m2    GFR est non-AA >60 >60 ml/min/1.73m2    Calcium 8.8 8.5 - 10.1 MG/DL    Bilirubin, total 1.2 (H) 0.2 - 1.0 MG/DL    ALT (SGPT) 261 (H) 12 - 78 U/L    AST (SGOT) 86 (H) 15 - 37 U/L    Alk. phosphatase 125 (H) 45 - 117 U/L    Protein, total 7.4 6.4 - 8.2 g/dL    Albumin 3.9 3.5 - 5.0 g/dL    Globulin 3.5 2.0 - 4.0 g/dL    A-G Ratio 1.1 1.1 - 2.2           Discharge Medication List as of 3/27/2019 10:34 AM      CONTINUE these medications which have NOT CHANGED    Details   hydroCHLOROthiazide (HYDRODIURIL) 25 mg tablet Take 1 Tab by mouth daily. , Normal, Disp-90 Tab, R-1      atorvastatin (LIPITOR) 20 mg tablet Take 1 Tab by mouth daily. , Normal, Disp-90 Tab, R-1      carvedilol (COREG) 6.25 mg tablet TAKE 1 TABLET BY MOUTH TWICE A DAY WITH FOOD, NormalNeeds appt with an MDDisp-60 Tab, R-0      enalapril (VASOTEC) 20 mg tablet TAKE 1 TABLET BY MOUTH TWICE A DAY **NEED APPT**, Normal, Disp-60 Tab, R-0      amLODIPine (NORVASC) 5 mg tablet TAKE 1 TABLET BY MOUTH EVERY DAY, Normal, Disp-90 Tab, R-0      naproxen sodium (ALEVE) 220 mg tablet Take 220 mg by mouth two (2) times daily as needed for Pain., OTC, Disp-60 Tab, R-0      aspirin delayed-release 81 mg tablet Take 81 mg by mouth daily. , Historical Med             Admission imaging studies:    No results found for this or any previous visit. Results from [place] encounter on 03/25/19   US ABD LTD    Narrative EXAM:  ABDOMEN, LTD - US*     INDICATION: Right upper quadrant pain. COMPARISON: CT from earlier today. .    TECHNIQUE:   Limited real-time sonography of the right upper quadrant of the abdomen was  performed with multiple static images of the liver, gallbladder, pancreatic head  and right kidney obtained. FINDINGS:  LIVER:   The liver is normal in echotexture with no mass or other focal abnormality. The  portal vein flow is hepatopedal.    GALLBLADDER:  The gallbladder contains multiple stones. . There is no wall thickening or fluid  around the gallbladder. COMMON BILE DUCT:  There is no intrahepatic biliary duct dilatation but the common duct measures 8  mm in diameter. PANCREAS:  The pancreatic head is obscured by bowel gas. KIDNEYS:  The right kidney demonstrates normal echogenicity with no mass, stone or  hydronephrosis. The right kidney measures 11.4 cm in length. The body and tail of the pancreas, left kidney, spleen and retroperitoneum were  not evaluated on this right upper quadrant examination. Impression IMPRESSION:    Cholelithiasis with mild extrahepatic biliary dilatation  Common bile duct stone not demonstrated with ultrasound technique. Intrahepatic biliary dilatation not as evident on ultrasound.               Results from [place] encounter on 03/25/19   CT ABD PELV W CONT    Narrative EXAM: CT ABD PELV W CONT    INDICATION: epigastric pain radiating to back . Recurrent. COMPARISON: None     CONTRAST: 100 mL of Isovue-370. TECHNIQUE:   Following the uneventful intravenous administration of contrast, thin axial  images were obtained through the abdomen and pelvis. Coronal and sagittal  reconstructions were generated. Oral contrast was not administered. CT dose  reduction was achieved through use of a standardized protocol tailored for this  examination and automatic exposure control for dose modulation. FINDINGS:   LUNG BASES: Clear. INCIDENTALLY IMAGED HEART AND MEDIASTINUM: Unremarkable. LIVER: No mass or biliary dilatation. GALLBLADDER: 17 mm filling defect in the gallbladder dependently (image 30). CBD  measures 9.5 mm and there is minimal intrahepatic biliary dilatation. SPLEEN: No mass. PANCREAS: No mass or ductal dilatation. ADRENALS: Unremarkable. KIDNEYS: No mass, calculus, or hydronephrosis. STOMACH: Unremarkable. SMALL BOWEL: No dilatation or wall thickening. COLON: No dilatation or wall thickening. APPENDIX: Not visualized. PERITONEUM: No ascites or pneumoperitoneum. RETROPERITONEUM: No lymphadenopathy or aortic aneurysm. REPRODUCTIVE ORGANS: Small prostate  URINARY BLADDER: No mass or calculus. BONES: No destructive bone lesion. ADDITIONAL COMMENTS: N/A      Impression IMPRESSION:  Gallbladder sludge ball versus mass versus polyp. Mild intra and extrahepatic  biliary dilatation. Ultrasound recommended.                No procedure found.      -------------------------------------------------------------------------------------------------------------------    Chronic Diagnoses:    Problem List as of 3/27/2019 Date Reviewed: 5/2/2017          Codes Class Noted - Resolved    Symptomatic cholelithiasis ICD-10-CM: K80.20  ICD-9-CM: 574.20  3/26/2019 - Present        Abdominal pain ICD-10-CM: R10.9  ICD-9-CM: 789.00  3/25/2019 - Present        * (Principal) Elevated LFTs ICD-10-CM: R94.5  ICD-9-CM: 790.6 3/25/2019 - Present        Presence of bare metal stent in LAD coronary artery ICD-10-CM: Z95.5  ICD-9-CM: V45.82  10/3/2016 - Present        Abnormal stress test ICD-10-CM: R94.39  ICD-9-CM: 794.39  9/13/2016 - Present        Coronary artery disease of native artery of native heart with stable angina pectoris (HCC) ICD-10-CM: I25.118  ICD-9-CM: 414.01, 413.9  9/13/2016 - Present        ARYA (obstructive sleep apnea) ICD-10-CM: G47.33  ICD-9-CM: 327.23  5/16/2012 - Present        OA (osteoarthritis) of knee (Chronic) ICD-10-CM: M17.10  ICD-9-CM: 715.36  6/21/2010 - Present        HTN (hypertension) ICD-10-CM: I10  ICD-9-CM: 401.9  4/17/2009 - Present        ED (erectile dysfunction) ICD-10-CM: N52.9  ICD-9-CM: 607.84  4/17/2009 - Present        Hyperlipemia ICD-10-CM: E78.5  ICD-9-CM: 272.4  4/17/2009 - Present        RESOLVED: Prediabetes ICD-10-CM: R73.03  ICD-9-CM: 790.29  2/21/2012 - 6/14/2018        RESOLVED: Chest pain ICD-10-CM: R07.9  ICD-9-CM: 786.50  4/17/2009 - 1/13/2012    Overview Signed 4/17/2009  3:30 PM by Opal Cardona nuclear stress - '04                     Signed:      Amrit Shepherd MD   Family Medicine Resident      3/27/2019     Beatriz Rm MD   Family Medicine Attending    [address] [facility] Medicine Residency Attending Addendum:  I saw and evaluated the patient on the day of the encounter with Dr. Kwaku Crouch, performing the key elements of the service. I discussed the findings, assessment and plan with the resident and agree with the resident's findings and plan as documented in the resident's note. Recommend close OP follow up. Can consider surgery as outpatient.      Beatriz Rm MD, CAQSM, RMSK

## 2019-03-27 NOTE — PROGRESS NOTES
Bedside shift change report given to [address] [place] [address] (oncoming nurse) by Cristian Rae (offgoing nurse). Report included the following information SBAR, Kardex, MAR and Recent Results.

## 2019-03-27 NOTE — PROGRESS NOTES
[facility]   Daily Progress Note    Date: 3/27/2019    [address] Medicine Residency Attending Addendum:  I saw and evaluated the patient on the day of the encounter with Dr. Elan Philippe, performing the atkins elements of the service. I discussed the findings, assessment and plan with the resident and agree with the resident's findings and plan as documented in the resident's note. Recommend close follow up outpatient. Patient refusing surgery    Bryson Patrick MD, Greer Mccartney, JACKSONK          Assessment/Plan:   Artstefani Harris is a 66 y.o. male who is hospitalized for abdominal pain 2/2 cholelithiasis. Abdominal Pain due to biliary colic: Abd US + cholelithiasis with negative MRCP however pt history and description of pain is c/w passing a stone. Patient does not want to go to OR today - prefers to wait for another episode of abdominal pain to re-evaluate need for surgery. Discussed risks of stone obstructing biliary tree in future and likelihood of having repeat episode being very high.  - NPO for lap josh today  -  EDWIN, Smooth muscle antibody, mitochondrial ab, liver-kidney microsomal ab pending  - Surgery to speak with patient this morning regarding risks vs benefits of surgery today and options for doing lap josh as outpatient  -[address] consulted, appreciate recs  - Surgery consulted, appreciate recs      Pre Diabetes - HgA1c 5.4 (6/14/18)  - Diet controlled  - Daily CMP     Hypertension- BPs elevated to 160s/90s overnight however patient not on full home regimen. Will resume home medications this morning  - Continue norvasc 5 mg tab daily  - Continue coreg 6.25 mg tab BID  - Resume enalapril 20 mg tab BID  - Resume hydrochlorothiazide 25 mg tab daily.    - Will continue to monitor at this time and readjust as BP's trend.     CAD- s/p stent placement 2016  - Continue home ASA 81 mg tab  - Continue home lipitor 20 mg tab qhs     Hypokalemia - POA K 3.1 s/p klor-marlen 40 meq tab  - daily CMP  - replete as needed     HLD - T. Chol 144, TL 62, HDL 60, LDL 72 (11/29/2017)  - Continue home lipitor 20 mg tab qhs     Obesity  - PT with BMI of Body mass index is 31 kg/m². - Encouraging lifestyle modifications and further follow up outpatient.         FEN/GI - NPO for OR this morning  Activity - Ambulate as tolerated  DVT prophylaxis - SCDs  GI prophylaxis - Not indicated at this time  Fall prophylaxis - Not indicated at this time. Disposition - Admit to Medical. Plan to d/c to Home. Code Status - Full, discussed with patient / caregivers. Next of Emeli 69 Name and [id number]    Patient to be discussed with Dr. Capri Anderson, supervising physician. Hussain Stafford MD  Family Medicine Resident         CC: \"I feel good\"    Subjective  No acute events overnight. Patient does not want to go to OR today. He prefers to employ lifestyle changes to see if he can reduce episodes of pain. Discussed the likelihood that he will have repeat episodes of pain and if a gallstone gets stuck in the biliary tree he could have significant complications and require emergent surgery. Patient still prefers  to wait for surgery. HE would also like to speak with the general surgery team this morning. Patient denies fever, chills, nausea, vomiting and abdominal pain. He is tolerating a full diet well.     Inpatient Medications  Current Facility-Administered Medications   Medication Dose Route Frequency    lisinopril (PRINIVIL, ZESTRIL) tablet 20 mg  20 mg Oral BID    hydroCHLOROthiazide (HYDRODIURIL) tablet 25 mg  25 mg Oral DAILY    atorvastatin (LIPITOR) tablet 20 mg  20 mg Oral DAILY    potassium chloride SR (KLOR-CON 10) tablet 40 mEq  40 mEq Oral NOW    sodium chloride (NS) flush 5-40 mL  5-40 mL IntraVENous Q8H    sodium chloride (NS) flush 5-40 mL  5-40 mL IntraVENous PRN    ibuprofen (MOTRIN) tablet 400 mg  400 mg Oral Q4H PRN    ondansetron (ZOFRAN) injection 4 mg  4 mg IntraVENous Q4H PRN    ketorolac (TORADOL) injection 30 mg  30 mg IntraVENous Q6H PRN    amLODIPine (NORVASC) tablet 5 mg  5 mg Oral DAILY    carvedilol (COREG) tablet 6.25 mg  6.25 mg Oral BID WITH MEALS    oxyCODONE IR (ROXICODONE) tablet 5 mg  5 mg Oral Q4H PRN         Allergies  Allergies   Allergen Reactions    Nifedipine Other (comments)     Caused terrible feeling/not himself/drowsey     Quinine Itching         Objective  Vitals:  Patient Vitals for the past 8 hrs:   Temp Pulse Resp BP SpO2   03/27/19 0519 98.1 °F (36.7 °C) 80 18 (!) 165/98 100 %         I/O:    Intake/Output Summary (Last 24 hours) at 3/27/2019 0726  Last data filed at 3/26/2019 1530  Gross per 24 hour   Intake 1000 ml   Output --   Net 1000 ml     Last shift:    No intake/output data recorded. Last 3 shifts:    03/25 1901 - 03/27 0700  In: 1186.3 [P.O.:480; I.V.:706.3]  Out: -     Physical Exam:  General: No acute distress. Alert. Cooperative. Head: Normocephalic. Atraumatic. Eyes:  Conjunctiva pink. Sclera white. PERRL. Nose:  Septum midline. Mucosa pink. No drainage. Throat: Mucosa pink. Moist mucous membranes. No tonsillar exudates or erythema. Palate movement equal bilaterally. Respiratory: CTAB. No w/r/r/c.   Cardiovascular: RRR. Normal S1,S2. No m/r/g. Pulses 2+ throughout. GI: + bowel sounds. Nontender. No rebound tenderness or guarding. Nondistended   Extremities: No edema. No palpable cord. No tenderness.      Laboratory Data  Recent Results (from the past 12 hour(s))   CBC W/O DIFF    Collection Time: 03/27/19  5:47 AM   Result Value Ref Range    WBC 6.2 4.1 - 11.1 K/uL    RBC 4.94 4.10 - 5.70 M/uL    HGB 14.8 12.1 - 17.0 g/dL    HCT 44.7 36.6 - 50.3 %    MCV 90.5 80.0 - 99.0 FL    MCH 30.0 26.0 - 34.0 PG    MCHC 33.1 30.0 - 36.5 g/dL    RDW 13.5 11.5 - 14.5 %    PLATELET 037 243 - 134 K/uL    MPV 11.2 8.9 - 12.9 FL    NRBC 0.0 0  WBC    ABSOLUTE NRBC 0.00 0.00 - 3.47 K/uL   METABOLIC PANEL, COMPREHENSIVE    Collection Time: 03/27/19  5:47 AM   Result Value Ref Range    Sodium 138 136 - 145 mmol/L    Potassium 3.3 (L) 3.5 - 5.1 mmol/L    Chloride 105 97 - 108 mmol/L    CO2 28 21 - 32 mmol/L    Anion gap 5 5 - 15 mmol/L    Glucose 104 (H) 65 - 100 mg/dL    BUN 10 6 - 20 MG/DL    Creatinine 0.88 0.70 - 1.30 MG/DL    BUN/Creatinine ratio 11 (L) 12 - 20      GFR est AA >60 >60 ml/min/1.73m2    GFR est non-AA >60 >60 ml/min/1.73m2    Calcium 8.8 8.5 - 10.1 MG/DL    Bilirubin, total 1.2 (H) 0.2 - 1.0 MG/DL    ALT (SGPT) 261 (H) 12 - 78 U/L    AST (SGOT) 86 (H) 15 - 37 U/L    Alk.  phosphatase 125 (H) 45 - 117 U/L    Protein, total 7.4 6.4 - 8.2 g/dL    Albumin 3.9 3.5 - 5.0 g/dL    Globulin 3.5 2.0 - 4.0 g/dL    A-G Ratio 1.1 1.1 - 2.2         Imaging  MRCP 3/26/19: no evidence of obstruction       Hospital Problems:  Hospital Problems  Date Reviewed: 5/2/2017          Codes Class Noted POA    Symptomatic cholelithiasis ICD-10-CM: K80.20  ICD-9-CM: 574.20  3/26/2019 Yes        Abdominal pain ICD-10-CM: R10.9  ICD-9-CM: 789.00  3/25/2019 Yes        * (Principal) Elevated LFTs ICD-10-CM: R94.5  ICD-9-CM: 790.6  3/25/2019 Yes        HTN (hypertension) ICD-10-CM: I10  ICD-9-CM: 401.9  4/17/2009 Yes        Hyperlipemia ICD-10-CM: E78.5  ICD-9-CM: 272.4  4/17/2009 Yes

## 2019-03-27 NOTE — PROGRESS NOTES
Gastrointestinal Progress Note    3/27/2019    Admit Date: 3/25/2019    Subjective:     No new complaints. Denies abdominal pain, nausea or vomiting. Voices that he does not want to have surgery today. We discussed potential risks of reoccurrence and complications that can include choledocholithiasis, cholangitis, gallstone pancreatitis. He voices understanding but due to recent family affairs he prefers to wait on surgery. Current Facility-Administered Medications   Medication Dose Route Frequency    lisinopril (PRINIVIL, ZESTRIL) tablet 20 mg  20 mg Oral BID    hydroCHLOROthiazide (HYDRODIURIL) tablet 25 mg  25 mg Oral DAILY    atorvastatin (LIPITOR) tablet 20 mg  20 mg Oral DAILY    sodium chloride (NS) flush 5-40 mL  5-40 mL IntraVENous Q8H    sodium chloride (NS) flush 5-40 mL  5-40 mL IntraVENous PRN    ibuprofen (MOTRIN) tablet 400 mg  400 mg Oral Q4H PRN    ondansetron (ZOFRAN) injection 4 mg  4 mg IntraVENous Q4H PRN    ketorolac (TORADOL) injection 30 mg  30 mg IntraVENous Q6H PRN    amLODIPine (NORVASC) tablet 5 mg  5 mg Oral DAILY    carvedilol (COREG) tablet 6.25 mg  6.25 mg Oral BID WITH MEALS    oxyCODONE IR (ROXICODONE) tablet 5 mg  5 mg Oral Q4H PRN        Objective:     Blood pressure (!) 165/98, pulse 80, temperature 98.1 °F (36.7 °C), resp. rate 18, height 6' 1\" (1.854 m), weight 106.6 kg (235 lb), SpO2 100 %. No intake/output data recorded.     03/25 1901 - 03/27 0700  In: 1186.3 [P.O.:480; I.V.:706.3]  Out: -     EXAM:  GENERAL: alert, cooperative, no distress, HEART: regular rate and rhythm, S1, S2 normal, no murmur, click, rub or gallop, LUNGS: chest clear, no wheezing, rales, normal symmetric air entry, ABDOMEN:  Bowel sounds present, soft, nondistended, nontender EXTREMITY: extremities normal, atraumatic, no cyanosis or edema      Data Review    Recent Results (from the past 24 hour(s))   CBC W/O DIFF    Collection Time: 03/27/19  5:47 AM   Result Value Ref Range    WBC 6.2 4.1 - 11.1 K/uL    RBC 4.94 4.10 - 5.70 M/uL    HGB 14.8 12.1 - 17.0 g/dL    HCT 44.7 36.6 - 50.3 %    MCV 90.5 80.0 - 99.0 FL    MCH 30.0 26.0 - 34.0 PG    MCHC 33.1 30.0 - 36.5 g/dL    RDW 13.5 11.5 - 14.5 %    PLATELET 264 851 - 262 K/uL    MPV 11.2 8.9 - 12.9 FL    NRBC 0.0 0  WBC    ABSOLUTE NRBC 0.00 0.00 - 7.76 K/uL   METABOLIC PANEL, COMPREHENSIVE    Collection Time: 03/27/19  5:47 AM   Result Value Ref Range    Sodium 138 136 - 145 mmol/L    Potassium 3.3 (L) 3.5 - 5.1 mmol/L    Chloride 105 97 - 108 mmol/L    CO2 28 21 - 32 mmol/L    Anion gap 5 5 - 15 mmol/L    Glucose 104 (H) 65 - 100 mg/dL    BUN 10 6 - 20 MG/DL    Creatinine 0.88 0.70 - 1.30 MG/DL    BUN/Creatinine ratio 11 (L) 12 - 20      GFR est AA >60 >60 ml/min/1.73m2    GFR est non-AA >60 >60 ml/min/1.73m2    Calcium 8.8 8.5 - 10.1 MG/DL    Bilirubin, total 1.2 (H) 0.2 - 1.0 MG/DL    ALT (SGPT) 261 (H) 12 - 78 U/L    AST (SGOT) 86 (H) 15 - 37 U/L    Alk. phosphatase 125 (H) 45 - 117 U/L    Protein, total 7.4 6.4 - 8.2 g/dL    Albumin 3.9 3.5 - 5.0 g/dL    Globulin 3.5 2.0 - 4.0 g/dL    A-G Ratio 1.1 1.1 - 2.2         Assessment:     Principal Problem:    Elevated LFTs (3/25/2019)    Active Problems:    HTN (hypertension) (4/17/2009)      Hyperlipemia (4/17/2009)      Abdominal pain (3/25/2019)      Symptomatic cholelithiasis (3/26/2019)      Liver enzymes are trending down towards normal and MRCP negative for choledocholithiasis. Suspect patient passed a stone. Acute hepatitis panel negative. Plan:     1. Monitor LFTs to ensure they return to normal (trending down) - can repeat as outpatient and if normalizes then no additional liver workup needed  3. Dr. Dnaiela Alonso made aware of patient's decision regarding surgery  4.   Stable from GI standpoint    Carlos Eduardo Prado PA-C  03/27/19  8:36 AM    I have interviewed and examined patient with addendum to note above and formulation care plan to reflect my evaluation    Sondra Browne M.D.

## 2019-03-27 NOTE — PROGRESS NOTES
9:30 Night RN informed day RN that patient had read information online about his impending procedure today and decided for himself that the procedure was not necessary at this time. Family practice was informed of patient's opinion by night RN. Patient stated to day RN that he wasn't going to get the procedure today. According to family practice the patient wants to wait for another episode to arise before surgery is considered. Dr. Yariel Sanches' called day RN that if patient does not want procedure because he look up information online about it then there is no need for him to stay at the hospital and that he can be discharge from his standpoint.

## 2019-03-27 NOTE — PHYSICIAN ADVISORY
Letter of Status Determination:   Recommend hospitalization status upgraded from   OBSERVATION  to INPATIENT  Status     Pt Name:  Tariq Jauregui   [facility] # [id number] /  [id number]  Payor: Flaquito Miranda / Plan: Claribel Coleman PPO / Product Type: PPO /    CSN#  [id number]   Room and Hospital  528/01  @ [facility]   Hospitalization date  3/25/2019  2:02 PM   Current Attending Physician  Brent Zavala MD   Principal diagnosis  Elevated LFTs    Clinicals    Mr. Jennifer Caicedo is a 66 y.o.   afican [place]  male who is admitted with  Abdominal pain. Mr. Jennifer Caicedo presented to the Emergency Department today complaining of  Right sided abdominal pain which has been going on for the past day or so, no nausea , no vomiting, no fever, no chills, no diarrhea, no constipation, He recently has been battling the flu as per patient and recently returned from [facility] where he was for his sisters , he had similar complain in the past but it resolved with advil,In the Ed he was found to have elevated LFT, ulysses of about 2.1, he denies any regular alcohol use, hw appeared very comfortable, and in no acute distress, even though he complained of pain he is in no distressed, vitals in the Ed stable, he received iv pain meds,MRCP negative, but still with pain, NPO for lap josh, poorly controlled HTN, hypokalemic, LFT's still elevated this am, SBP still in 160-170s this am, ?  Sec to pain, needs medical and surgical optimization        Milliman (MCG) criteria       STATUS DETERMINATION  INPATIENT    The final decision of the patient's hospitalization status depends on the attending physician's judgment    Additional comments     Payor: Flaquito Miranda / Plan: Claribel Coleman PPO / Product Type: PPO /         Sharyle Plant MD  Cell: [phone]  Physician Advisor

## 2019-03-27 NOTE — PROGRESS NOTES
GI note    MRCP not supportive of diagnosis of choledocholithiasis and laboratory trend shows ongoing biliary drainage. I had a slot reserved for ERCP were it to be needed  --  but in my judgement it is not.       Michelle Rodrigues MD

## 2019-03-27 NOTE — DISCHARGE INSTRUCTIONS
HOME DISCHARGE INSTRUCTIONS    Gordo Gonzaels / [id number] : 1960    Admission date: 3/25/2019 Discharge date: 3/27/2019     Please bring this form with you to show your care provider at your follow-up appointment. Primary care provider:  Genevieve Silvestre MD    Discharging provider:  Beba Wagner MD  - Family Medicine Resident  Ramírez Harden MD - Attending, Family Medicine     You have been admitted to the hospital with the following diagnoses:    ACUTE DIAGNOSES:  Abdominal pain [R10.9]  · Abdominal pain [R10.9]   · Cholelithiasis  . . . . . . . . . . . . . . . . . . . . . . . . . . . . . . . . . . . . . . . . . . . . . . . . . . . . . . . . . . . . . . . . . . . . . . . Harry Fischer FOLLOW-UP CARE RECOMMENDATIONS:  - See General Surgery as outpatient to discuss surgery to remove gallbladder  - Have your PCP check your liver function tests at your hospital follow up appointment     MEDICATION CHANGES: NONE, continue all of your regular medications    Appointments  Follow-up Information     Follow up With Specialties Details Why Contact Info    Gin Ha MD Family Practice Go on 2019 hospital followup @ 12:05pm [place] [address]  [phone]      Heath Padgett MD Surgery Schedule an appointment as soon as possible for a visit to discuss outpatient laproscopic cholecystectomy  [address] [facility] [address] [facility]  [phone]             Follow-up tests needed: liver function tests and total bilirubin at PCP followup    Pending test results: At the time of your discharge the following test results are still pending: NONE. Please make sure you review these results with your outpatient follow-up provider(s). Specific symptoms to watch for: chest pain, shortness of breath, fever, chills, nausea, vomiting, diarrhea, change in mentation, falling, weakness, bleeding.      DIET/what to eat:  Low fat, Low cholesterol    ACTIVITY:  Activity as tolerated    Wound care: NONE    Equipment needed:  NONE    What to do if new or unexpected symptoms occur? If you experience any of the above symptoms (or should other concerns or questions arise after discharge) please call your primary care physician. Return to the emergency room if you cannot get hold of your doctor. · It is very important that you keep your follow-up appointment(s). · Please bring discharge papers, medication list (and/or medication bottles) to your follow-up appointments for review by your outpatient provider(s). · Please check the list of medications and be sure it includes every medication (even non-prescription medications) that your provider wants you to take. · It is important that you take the medication exactly as they are prescribed. · Keep your medication in the bottles provided by the pharmacist and keep a list of the medication names, dosages, and times to be taken in your wallet. · Do not take other medications without consulting your doctor. · If you have any questions about your medications or other instructions, please talk to your nurse or care provider before you leave the hospital.     Information obtained by:     I understand that if any problems occur once I am at home I am to contact my physician. These instructions were explained to me and I had the opportunity to ask questions. I understand and acknowledge receipt of the instructions indicated above.                                                                                                                                                Physician's or R.N.'s Signature                                                                  Date/Time                                                                                                                                              Patient or Representative Signature                                                          Date/Time

## 2019-03-27 NOTE — PROGRESS NOTES
Bedside and Verbal shift change report given to Taylor Mckeon (oncoming nurse) by Elvira Jhaveri (offgoing nurse). Report included the following information SBAR, Kardex, Intake/Output, MAR and Recent Results.

## 2019-04-04 ENCOUNTER — OFFICE VISIT (OUTPATIENT)
Dept: FAMILY MEDICINE CLINIC | Age: 59
End: 2019-04-04

## 2019-04-04 VITALS
DIASTOLIC BLOOD PRESSURE: 82 MMHG | OXYGEN SATURATION: 100 % | TEMPERATURE: 98 F | WEIGHT: 232 LBS | HEIGHT: 73 IN | RESPIRATION RATE: 16 BRPM | HEART RATE: 64 BPM | BODY MASS INDEX: 30.75 KG/M2 | SYSTOLIC BLOOD PRESSURE: 133 MMHG

## 2019-04-04 DIAGNOSIS — K80.20 CALCULUS OF GALLBLADDER WITHOUT CHOLECYSTITIS WITHOUT OBSTRUCTION: Primary | ICD-10-CM

## 2019-04-04 NOTE — PROGRESS NOTES
Haylee Parker is a 66 y.o. male      Chief Complaint   Patient presents with   [facility] Follow Up     one week ago ( Gallstones )            1. Have you been to the ER, urgent care clinic since your last visit? Hospitalized since your last visit? one week ago ( Gallstones )         2. Have you seen or consulted any other health care providers outside of the [address] [place] [address] since your last visit? Include any pap smears or colon screening.

## 2019-04-04 NOTE — PATIENT INSTRUCTIONS
Learning About Cholecystectomy  What is cholecystectomy  Cholecystectomy (say \"koh-anup-sis-LYNDA-tuh-tootie\") is surgery to remove the gallbladder and gallstones. The gallbladder stores bile made by your liver. The bile helps you digest fats. Gallstones are made of cholesterol and other things found in bile. Your body will work fine without a gallbladder. Bile will go straight from the liver to the intestine. There may be small changes in how you digest food. But you probably will not notice them. How is the surgery done? Cholecystectomy is usually laparoscopic surgery. To do this type of surgery, a doctor puts a lighted tube, or scope, and other surgical tools through small cuts (incisions) in your belly. The doctor is able to see your organs with the scope. After your gallbladder is removed, you will no longer have gallstones. The cuts leave scars that usually fade with time. Open surgery may be done if problems are found during laparoscopic surgery. With open surgery, the gallbladder is removed through one larger cut in your belly. And the hospital stay is longer. What can you expect after surgery? It is normal to feel weak and tired for several days after you return home. Your belly may be swollen. If you had laparoscopic surgery, you may also have pain in your shoulder for about 24 hours. You may have gas or need to burp a lot at first.  A few people get diarrhea. The diarrhea usually goes away in 2 to 4 weeks. But it may last longer. How quickly you get better depends on which kind of surgery you had. For laparoscopic surgery, most people can go back to work or their normal routine in 1 to 2 weeks. It depends on the type of work you do and how you feel. If you have open surgery, it will probably take 4 to 6 weeks before you get back to your normal routine. Follow-up care is a key part of your treatment and safety.  Be sure to make and go to all appointments, and call your doctor if you are having problems. It's also a good idea to know your test results and keep a list of the medicines you take. Where can you learn more? Go to http://robert-anson.info/. Enter G017 in the search box to learn more about \"Learning About Cholecystectomy. \"  Current as of: March 27, 2018  Content Version: 11.9  © 2001-0806 [employer]. Care instructions adapted under license by [employer] (which disclaims liability or warranty for this information). If you have questions about a medical condition or this instruction, always ask your healthcare professional. [address] any warranty or liability for your use of this information. Low-Fat Diet for Gallbladder Disease: Care Instructions  Your Care Instructions    When you eat, the gallbladder releases bile, which helps you digest the fat in food. If you have an inflamed gallbladder, this may cause pain. A low-fat diet may give your gallbladder a rest so you can start to heal. Your doctor and dietitian can help you make an eating plan that does not irritate your digestive system. Always talk with your doctor or dietitian before you make changes in your diet. Follow-up care is a key part of your treatment and safety. Be sure to make and go to all appointments, and call your doctor if you are having problems. It's also a good idea to know your test results and keep a list of the medicines you take. How can you care for yourself at home? · Eat many small meals and snacks each day instead of three large meals. · Choose lean meats. ? Eat no more than 5 to 6½ ounces of meat a day. ? Cut off all fat you can see. ? Eat chicken and turkey without the skin. ? Many types of fish, such as salmon, lake trout, tuna, and herring, provide healthy omega-3 fat. But, avoid fish canned in oil, such as sardines in olive oil. ? Bake, broil, or grill meats, poultry, or fish instead of frying them in butter or fat.   · Drink or eat nonfat or low-fat milk, yogurt, cheese, or other milk products each day. ? Read the labels on cheeses, and choose those with less than 5 grams of fat an ounce. ? Try fat-free sour cream, cream cheese, or yogurt. ? Avoid cream soups and cream sauces on pasta. ? Eat low-fat ice cream, frozen yogurt, or sorbet. Avoid regular ice cream.  · Eat whole-grain cereals, breads, crackers, rice, or pasta. Avoid high-fat foods such as croissants, scones, biscuits, waffles, doughnuts, muffins, granola, and high-fat breads. · Flavor your foods with herbs and spices (such as basil, tarragon, or mint), fat-free sauces, or lemon juice instead of butter. You can also use butter substitutes, fat-free mayonnaise, or fat-free dressing. · Try applesauce, prune puree, or mashed bananas to replace some or all of the fat when you bake. · Limit fats and oils, such as butter, margarine, mayonnaise, and salad dressing, to no more than 1 tablespoon a meal.  · Avoid high-fat foods, such as:  ? Chocolate, whole milk, ice cream, and processed cheese. ? Fried or buttered foods. ? Sausage, salami, and sarah. ? Cinnamon rolls, cakes, pies, cookies, and other pastries. ? Prepared snack foods, such as potato chips, nut and granola bars, and mixed nuts. ? Coconut and avocado. · Learn how to read food labels for serving sizes and ingredients. Fast-food and convenience-food meals often have lots of fat. Where can you learn more? Go to http://robert-anson.info/. Enter A254 in the search box to learn more about \"Low-Fat Diet for Gallbladder Disease: Care Instructions. \"  Current as of: March 28, 2018  Content Version: 11.9  © 5776-9755 [employer]. Care instructions adapted under license by [employer] (which disclaims liability or warranty for this information).  If you have questions about a medical condition or this instruction, always ask your healthcare professional. [employer] disclaims any warranty or liability for your use of this information.

## 2019-04-04 NOTE — PROGRESS NOTES
Thi Santillan is a 66 y.o. male who presents today with the following:    HPI  Chief Complaint   Patient presents with   [facility] Follow Up     one week ago ( Gallstones )      patient was scheduled for lap cholecystectomy at the hospital but he refused to get surgery done. He is grieving his sister's death right now. He is not mentally ready to get surgery done right now. 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 He is happy with his decision. He gets his medical information a lot from web Md. Review of Systems   Constitutional: Negative. HENT: Negative. Eyes: Negative. Respiratory: Negative. Cardiovascular: Negative. Gastrointestinal: Negative. Genitourinary: Negative. Musculoskeletal: Negative. Skin: Negative. Neurological: Negative. Endo/Heme/Allergies: Negative. Psychiatric/Behavioral: Negative. Physical Exam   Constitutional: He is oriented to person, place, and time and well-developed, well-nourished, and in no distress. HENT:   Head: Normocephalic and atraumatic. Right Ear: External ear normal.   Left Ear: External ear normal.   Nose: Nose normal.   Mouth/Throat: No oropharyngeal exudate. Eyes: Conjunctivae are normal.   Neck: Normal range of motion. Neck supple. No thyromegaly present. Cardiovascular: Normal rate and regular rhythm. Pulmonary/Chest: Effort normal and breath sounds normal.   Abdominal: Soft. Bowel sounds are normal. He exhibits no distension. There is no tenderness. Musculoskeletal: Normal range of motion. He exhibits no edema. Lymphadenopathy:     He has no cervical adenopathy. Neurological: He is alert and oriented to person, place, and time. Skin: Skin is warm and dry. Psychiatric: Mood and affect normal.   Nursing note and vitals reviewed.     /82 (BP 1 Location: Right arm, BP Patient Position: Sitting)   Pulse 64   Temp 98 °F (36.7 °C) (Oral)   Resp 16 Ht 6' 1\" (1.854 m)   Wt 232 lb (105.2 kg)   SpO2 100%   BMI 30.61 kg/m²     Allergies   Allergen Reactions    Nifedipine Other (comments)     Caused terrible feeling/not himself/drowsey     Quinine Itching       Current Outpatient Medications   Medication Sig    hydroCHLOROthiazide (HYDRODIURIL) 25 mg tablet Take 1 Tab by mouth daily.  atorvastatin (LIPITOR) 20 mg tablet Take 1 Tab by mouth daily.  carvedilol (COREG) 6.25 mg tablet TAKE 1 TABLET BY MOUTH TWICE A DAY WITH FOOD    enalapril (VASOTEC) 20 mg tablet TAKE 1 TABLET BY MOUTH TWICE A DAY **NEED APPT**    amLODIPine (NORVASC) 5 mg tablet TAKE 1 TABLET BY MOUTH EVERY DAY    aspirin delayed-release 81 mg tablet Take 81 mg by mouth daily.  naproxen sodium (ALEVE) 220 mg tablet Take 220 mg by mouth two (2) times daily as needed for Pain. No current facility-administered medications for this visit. Past Medical History:   Diagnosis Date    Arthritis     mainly knees, off and on    Coronary artery disease of native artery of native heart with stable angina pectoris ([facility] [address] 9/13/2016    1 stent - RCA - Dr. Jimmy Peña DM (diabetes mellitus) ([facility] [address] 12/10/2012    pre-diabetes    ED (erectile dysfunction) 4/17/2009    HTN 4/17/2009    Hypercholesterolemia     Hyperlipemia 4/17/2009       Past Surgical History:   Procedure Laterality Date    CARDIAC SURG PROCEDURE UNLIST  09/22/2016    1 stent    HX CORONARY STENT PLACEMENT      HX HEART CATHETERIZATION      HX PTCA         No results found for this visit on 04/04/19. 1. Calculus of gallbladder without cholecystitis without obstruction  Discussed in detail gall stones, cholecystitis, cholecystectomy. Patient will follow up for annual exam & will make a decision about surgery at that time. Follow-up and Dispositions    · Return if symptoms worsen or fail to improve.          I have discussed the diagnosis with the patient and the intended plan as seen in the above orders. The patient has received an after-visit summary and questions were answered concerning future plans. I have discussed medication side effects and warnings with the patient as well. The patient agrees and understands above plan.            Belynda Dakins, MD

## 2019-04-09 DIAGNOSIS — I10 ESSENTIAL HYPERTENSION WITH GOAL BLOOD PRESSURE LESS THAN 130/80: ICD-10-CM

## 2019-04-09 RX ORDER — AMLODIPINE BESYLATE 5 MG/1
TABLET ORAL
Qty: 90 TAB | Refills: 0 | Status: SHIPPED | OUTPATIENT
Start: 2019-04-09 | End: 2019-09-11 | Stop reason: SDUPTHER

## 2019-04-09 NOTE — TELEPHONE ENCOUNTER
Last visit noted, labs checked and refill deemed appropriate at this time. Verbal refill order authorized by covering physicians at [facility] for Dr. Reynaldo Lozano during his absence.     Appointment scheduled for 5/1/19 with Dr. Eliana Saucedo, PharmD, BCPS, CDE

## 2019-09-29 DIAGNOSIS — E78.5 HYPERLIPIDEMIA, UNSPECIFIED HYPERLIPIDEMIA TYPE: ICD-10-CM

## 2019-09-30 ENCOUNTER — OFFICE VISIT (OUTPATIENT)
Dept: FAMILY MEDICINE CLINIC | Age: 59
End: 2019-09-30

## 2019-09-30 VITALS
HEIGHT: 73 IN | HEART RATE: 66 BPM | OXYGEN SATURATION: 99 % | SYSTOLIC BLOOD PRESSURE: 126 MMHG | BODY MASS INDEX: 31.14 KG/M2 | TEMPERATURE: 98.4 F | WEIGHT: 235 LBS | RESPIRATION RATE: 18 BRPM | DIASTOLIC BLOOD PRESSURE: 71 MMHG

## 2019-09-30 DIAGNOSIS — Z12.11 ENCOUNTER FOR SCREENING COLONOSCOPY: Primary | ICD-10-CM

## 2019-09-30 DIAGNOSIS — I10 ESSENTIAL HYPERTENSION WITH GOAL BLOOD PRESSURE LESS THAN 130/80: ICD-10-CM

## 2019-09-30 DIAGNOSIS — Z12.5 SCREENING FOR PROSTATE CANCER: ICD-10-CM

## 2019-09-30 DIAGNOSIS — E78.5 HYPERLIPIDEMIA, UNSPECIFIED HYPERLIPIDEMIA TYPE: ICD-10-CM

## 2019-09-30 DIAGNOSIS — I10 ESSENTIAL HYPERTENSION: ICD-10-CM

## 2019-09-30 DIAGNOSIS — I25.118 CORONARY ARTERY DISEASE OF NATIVE ARTERY OF NATIVE HEART WITH STABLE ANGINA PECTORIS (HCC): ICD-10-CM

## 2019-09-30 RX ORDER — CARVEDILOL 6.25 MG/1
6.25 TABLET ORAL 2 TIMES DAILY
Qty: 180 TAB | Refills: 1 | Status: SHIPPED | OUTPATIENT
Start: 2019-09-30 | End: 2020-06-15

## 2019-09-30 RX ORDER — ATORVASTATIN CALCIUM 20 MG/1
TABLET, FILM COATED ORAL
Qty: 90 TAB | Refills: 1 | Status: SHIPPED | OUTPATIENT
Start: 2019-09-30 | End: 2020-07-20

## 2019-09-30 RX ORDER — AMLODIPINE BESYLATE 5 MG/1
5 TABLET ORAL DAILY
Qty: 90 TAB | Refills: 1 | Status: SHIPPED | OUTPATIENT
Start: 2019-09-30 | End: 2020-08-18

## 2019-09-30 RX ORDER — ENALAPRIL MALEATE 20 MG/1
20 TABLET ORAL 2 TIMES DAILY
Qty: 180 TAB | Refills: 1 | Status: SHIPPED | OUTPATIENT
Start: 2019-09-30 | End: 2021-06-14 | Stop reason: SDUPTHER

## 2019-09-30 RX ORDER — ATORVASTATIN CALCIUM 20 MG/1
20 TABLET, FILM COATED ORAL DAILY
Qty: 90 TAB | Refills: 1 | Status: SHIPPED | OUTPATIENT
Start: 2019-09-30 | End: 2019-09-30 | Stop reason: SDUPTHER

## 2019-09-30 RX ORDER — HYDROCHLOROTHIAZIDE 25 MG/1
25 TABLET ORAL DAILY
Qty: 90 TAB | Refills: 1 | Status: SHIPPED | OUTPATIENT
Start: 2019-09-30 | End: 2020-07-16

## 2019-09-30 NOTE — PROGRESS NOTES
Srinivasan Sherman is a 64 y.o. male      Chief Complaint   Patient presents with    Hypertension         1. Have you been to the ER, urgent care clinic since your last visit? Hospitalized since your last visit? NO      2. Have you seen or consulted any other health care providers outside of the [address] [place] [address] since your last visit? Include any pap smears or colon screening.   no

## 2019-09-30 NOTE — PROGRESS NOTES
Assessment and Plan    1. Hyperlipidemia, unspecified hyperlipidemia type  On statin  - atorvastatin (LIPITOR) 20 mg tablet; Take 1 Tab by mouth daily. Dispense: 90 Tab; Refill: 1  - LIPID PANEL  - HEPATIC FUNCTION PANEL    2. Essential hypertension with goal blood pressure less than 130/80  At goal  - amLODIPine (NORVASC) 5 mg tablet; Take 1 Tab by mouth daily. Dispense: 90 Tab; Refill: 1  - carvedilol (COREG) 6.25 mg tablet; Take 1 Tab by mouth two (2) times a day. Dispense: 180 Tab; Refill: 1  - enalapril (VASOTEC) 20 mg tablet; Take 1 Tab by mouth two (2) times a day. Dispense: 180 Tab; Refill: 1  - hydroCHLOROthiazide (HYDRODIURIL) 25 mg tablet; Take 1 Tab by mouth daily. Dispense: 90 Tab; Refill: 1    - METABOLIC PANEL, BASIC  - MICROALBUMIN, UR, RAND W/ MICROALB/CREAT RATIO    3. Coronary artery disease of native artery of native heart with stable angina pectoris (HCC)  Cont coreg, free of chest pain  - carvedilol (COREG) 6.25 mg tablet; Take 1 Tab by mouth two (2) times a day. Dispense: 180 Tab; Refill: 1    4. Encounter for screening colonoscopy  Patient requests, to Dr. Christin Knight    6. Screening for prostate cancer  Normal JULIO C  - PSA, DIAGNOSTIC (PROSTATE SPECIFIC AG)      Follow-up and Dispositions    · Return in about 6 months (around 3/30/2020) for Blood pressure follow up, Medication follow up. Diagnosis and plan discussed with patient who verbillized understanding. History of present illness:Jadon Sy is a 64 y.o. male presenting for Hypertension    Hypertension  On meds, takes consistently  No side effects    CAD  Chest pain free    Hypercholesterolemia  On statin    Wants PSA and colonoscopy    Review of Systems   Constitutional: Negative for chills, fever, malaise/fatigue and weight loss. HENT: Negative for ear pain, hearing loss, nosebleeds, sinus pain and sore throat. Eyes: Negative for blurred vision, double vision and pain. Respiratory: Negative for cough, hemoptysis, shortness of breath and wheezing. Cardiovascular: Negative for chest pain, palpitations and leg swelling. Gastrointestinal: Negative for abdominal pain, blood in stool, constipation, diarrhea, heartburn, nausea and vomiting. Genitourinary: Negative for dysuria, frequency, hematuria and urgency. Skin: Negative for itching and rash. Neurological: Negative for dizziness, seizures, loss of consciousness and headaches. Endo/Heme/Allergies: Negative for polydipsia. Does not bruise/bleed easily.          Past Medical History:   Diagnosis Date    Arthritis     mainly knees, off and on    Coronary artery disease of native artery of native heart with stable angina pectoris ([facility] [address] 9/13/2016    1 stent - RCA - Dr. Sadaf Navarrete [facility]2012    pre-diabetes    ED (erectile dysfunction) 4/17/2009    HTN 4/17/2009    Hypercholesterolemia     Hyperlipemia 4/17/2009     Past Surgical History:   Procedure Laterality Date    CARDIAC SURG PROCEDURE UNLIST  09/22/2016    1 stent    HX CORONARY STENT PLACEMENT      HX HEART CATHETERIZATION      HX PTCA       Family History   Problem Relation Age of Onset    Heart Disease Mother         premature    Heart Attack Mother     Hypertension Father     Stroke Father     Hypertension Sister     Diabetes Sister     Stroke Sister     No Known Problems Brother     Diabetes Sister     Cancer Neg Hx      Social History     Socioeconomic History    Marital status:      Spouse name: Not on file    Number of children: Not on file    Years of education: Not on file    Highest education level: Not on file   Occupational History    Not on file   Social Needs    Financial resource strain: Not on file    Food insecurity:     Worry: Not on file     Inability: Not on file    Transportation needs:     Medical: Not on file     Non-medical: Not on file   Tobacco Use    Smoking status: Former Smoker     Types: Cigars     Last attempt to quit: 4/27/2004     Years since quitting: 15.4    Smokeless tobacco: Never Used    Tobacco comment: 2-3 times a year   Substance and Sexual Activity    Alcohol use: Yes     Alcohol/week: 0.8 standard drinks     Types: 1 Cans of beer per week    Drug use: Never    Sexual activity: Yes     Partners: Female   Lifestyle    Physical activity:     Days per week: Not on file     Minutes per session: Not on file    Stress: Not on file   Relationships    Social connections:     Talks on phone: Not on file     Gets together: Not on file     Attends Hoahaoism service: Not on file     Active member of club or organization: Not on file     Attends meetings of clubs or organizations: Not on file     Relationship status: Not on file    Intimate partner violence:     Fear of current or ex partner: Not on file     Emotionally abused: Not on file     Physically abused: Not on file     Forced sexual activity: Not on file   Other Topics Concern    Not on file   Social History Narrative    Not on file         Current Outpatient Medications   Medication Sig Dispense Refill    atorvastatin (LIPITOR) 20 mg tablet Take 1 Tab by mouth daily. 90 Tab 1    amLODIPine (NORVASC) 5 mg tablet Take 1 Tab by mouth daily. 90 Tab 1    carvedilol (COREG) 6.25 mg tablet Take 1 Tab by mouth two (2) times a day. 180 Tab 1    enalapril (VASOTEC) 20 mg tablet Take 1 Tab by mouth two (2) times a day. 180 Tab 1    hydroCHLOROthiazide (HYDRODIURIL) 25 mg tablet Take 1 Tab by mouth daily. 90 Tab 1    aspirin delayed-release 81 mg tablet Take 81 mg by mouth daily.  naproxen sodium (ALEVE) 220 mg tablet Take 220 mg by mouth two (2) times daily as needed for Pain.  60 Tab 0         Allergies   Allergen Reactions    Nifedipine Other (comments)     Caused terrible feeling/not himself/drowsey     Quinine Itching       Vitals:    09/30/19 0815 09/30/19 0822   BP: 140/85 126/71   Pulse: 66    Resp: 18 Temp: 98.4 °F (36.9 °C)    SpO2: 99%    Weight: 235 lb (106.6 kg)    Height: 6' 1\" (1.854 m)      Body mass index is 31 kg/m². Objective  Physical Exam   Constitutional: He is oriented to person, place, and time. He appears well-developed and well-nourished. No distress. Eyes: Conjunctivae are normal. Right eye exhibits no discharge. Neck: Neck supple. No thyromegaly present. Cardiovascular: Normal rate, regular rhythm and normal heart sounds. Exam reveals no gallop and no friction rub. No murmur heard. Pulmonary/Chest: Effort normal and breath sounds normal. No respiratory distress. He has no wheezes. He has no rales. Genitourinary: Prostate normal. Rectal exam shows guaiac negative stool. Musculoskeletal: He exhibits no edema. Lymphadenopathy:     He has no cervical adenopathy. Neurological: He is alert and oriented to person, place, and time. Skin: He is not diaphoretic. Psychiatric: He has a normal mood and affect. His behavior is normal. Judgment and thought content normal.   Nursing note and vitals reviewed.

## 2019-09-30 NOTE — PATIENT INSTRUCTIONS
The goal blood pressure for most patients is 140/90. The whole point of treating blood pressure is to prevent strokes, heart attacks and kidney damage. Persistently elevated blood pressure damages blood vessels and can lead to catastrophic heart problems and strokes. Recommendations for Hypertension (elevated blood pressure):    · Purchase a blood pressure cuff that goes around your upper arm and check blood pressure at least 3 times per week. Write down your numbers for review. Check blood pressure in the morning and evening. Rest for 5 minutes with feet elevated and arm resting on a table (at mid-chest level) when checking blood pressure    · Exercise 30-60 minutes most days of the week, preferably with a mix of cardiovascular and strength training. Exercise can improve blood pressure, even if you do not lose weight! · Limit alcohol intake to less than 3-4 drinks per week. · Avoid tobacco products    · Avoid illegal drugs especially amphetamines  · DASH diet - includes fruits, vegetables, fiber, and less than 2000 mg sodium (salt) daily. · Try to eat a low sugar diet. Sugar worsens diabetes and activates kidney hormones that raise blood pressure. · Avoid non-steroidal inflammatory medications (NSAIDS) such as ibuprofen, advil, motrin, aleve, and naproxyn as these may increase blood pressure if used long-term    · Avoid OTC decongestants such as pseudopherine, phenylephrine, Afrin  · Take your blood pressure medicine (and aspirin if prescribed) religiously          The goal blood pressure for most patients is 140/90. The whole point of treating blood pressure is to prevent strokes, heart attacks and kidney damage. Persistently elevated blood pressure damages blood vessels and can lead to catastrophic heart problems and strokes.     Recommendations for Hypertension (elevated blood pressure):    · Purchase a blood pressure cuff that goes around your upper arm and check blood pressure at least 3 times per week. Write down your numbers for review. Check blood pressure in the morning and evening. Rest for 5 minutes with feet elevated and arm resting on a table (at mid-chest level) when checking blood pressure    · Exercise 30-60 minutes most days of the week, preferably with a mix of cardiovascular and strength training. Exercise can improve blood pressure, even if you do not lose weight! · Limit alcohol intake to less than 3-4 drinks per week. · Avoid tobacco products    · Avoid illegal drugs especially amphetamines  · DASH diet - includes fruits, vegetables, fiber, and less than 2000 mg sodium (salt) daily. · Try to eat a low sugar diet. Sugar worsens diabetes and activates kidney hormones that raise blood pressure.    · Avoid non-steroidal inflammatory medications (NSAIDS) such as ibuprofen, advil, motrin, aleve, and naproxyn as these may increase blood pressure if used long-term    · Avoid OTC decongestants such as pseudopherine, phenylephrine, Afrin  · Take your blood pressure medicine (and aspirin if prescribed) religiously

## 2020-04-27 ENCOUNTER — TELEPHONE (OUTPATIENT)
Dept: FAMILY MEDICINE CLINIC | Age: 60
End: 2020-04-27

## 2020-04-27 NOTE — TELEPHONE ENCOUNTER
Patient called on call service with severe abd pain. Called patient back radiating and patient complained of severe abd pain, rated 10/10 this morning but is now 5/10. Patient stated he vomited this morning and now has nausea. Patient denies fever, bloody stools. Advised patient to go to the ER to be evaluated.

## 2020-06-23 ENCOUNTER — OFFICE VISIT (OUTPATIENT)
Dept: FAMILY MEDICINE CLINIC | Age: 60
End: 2020-06-23

## 2020-06-23 VITALS
RESPIRATION RATE: 16 BRPM | SYSTOLIC BLOOD PRESSURE: 129 MMHG | TEMPERATURE: 98.6 F | HEIGHT: 73 IN | WEIGHT: 234 LBS | OXYGEN SATURATION: 100 % | DIASTOLIC BLOOD PRESSURE: 77 MMHG | HEART RATE: 57 BPM | BODY MASS INDEX: 31.01 KG/M2

## 2020-06-23 DIAGNOSIS — I10 ESSENTIAL HYPERTENSION: ICD-10-CM

## 2020-06-23 DIAGNOSIS — Z00.00 ANNUAL PHYSICAL EXAM: Primary | ICD-10-CM

## 2020-06-23 DIAGNOSIS — Z23 ENCOUNTER FOR IMMUNIZATION: ICD-10-CM

## 2020-06-23 DIAGNOSIS — E78.5 HYPERLIPIDEMIA, UNSPECIFIED HYPERLIPIDEMIA TYPE: ICD-10-CM

## 2020-06-23 DIAGNOSIS — Z12.11 SCREENING FOR COLON CANCER: ICD-10-CM

## 2020-06-23 DIAGNOSIS — Z12.5 SCREENING FOR PROSTATE CANCER: ICD-10-CM

## 2020-06-23 NOTE — PROGRESS NOTES
Identified pt with two pt identifiers(name and ). Reviewed record in preparation for visit and have obtained necessary documentation. Chief Complaint   Patient presents with    Annual Wellness Visit        Health Maintenance Due   Topic    Colonoscopy     Shingrix Vaccine Age 50> (1 of 2)    Lipid Screen        Coordination of Care Questionnaire:  :   1) Have you been to an emergency room, urgent care, or hospitalized since your last visit? If yes, where when, and reason for visit? No       2. Have seen or consulted any other health care provider since your last visit? If yes, where when, and reason for visit?   No

## 2020-06-23 NOTE — PROGRESS NOTES
Assessment and Plan    1. Annual physical exam  Diet exercise and safety discussed. 2. Screening for colon cancer  To Dr. Alf Duke, Never colonoscopy  - REFERRAL TO GASTROENTEROLOGY    3. Encounter for immunization  updated  - pneumococcal 23-valent (PNEUMOVAX 23) 25 mcg/0.5 mL injection; 0.5 mL by IntraMUSCular route once for 1 dose. Dispense: 0.5 mL; Refill: 0  - varicella-zoster recombinant, PF, (SHINGRIX) 50 mcg/0.5 mL susr injection; 0.5 mL by IntraMUSCular route once for 1 dose. Repeat second dose in 6 months. Dispense: 0.5 mL; Refill: 1    4. Screening for prostate cancer  Already had JULIO C  - PSA, DIAGNOSTIC (PROSTATE SPECIFIC AG); Future    5. Hyperlipidemia, unspecified hyperlipidemia type  Update labs  - HEPATIC FUNCTION PANEL; Future  - LIPID PANEL; Future    6. Essential hypertension  Follow up labs and test for DM. Nonsmoker.  - METABOLIC PANEL, BASIC; Future  - MICROALBUMIN, UR, RAND W/ MICROALB/CREAT RATIO; Future  - HEMOGLOBIN A1C WITH EAG; Future  - CBC WITH AUTOMATED DIFF; Future      Follow-up and Dispositions    · Return in about 6 months (around 12/23/2020) for Blood pressure follow up, Medication follow up. Diagnosis and plan discussed with patient who verbillized understanding. History of present illness:Jadon Carrington is a 64 y.o. male presenting for Annual Wellness Visit    Annual exam  Never got colonoscopy and labs ordered in September  Needs shingrix and Pneumovacc 23 (previous CAD)    Doing well with Coronary disease. Walks 3 miles per day and sticking to meds  No current sx. Review of Systems   Respiratory: Negative. Negative for shortness of breath. Cardiovascular: Negative. Negative for chest pain and leg swelling. Gastrointestinal: Negative for blood in stool. Genitourinary: Negative for hematuria.          Past Medical History:   Diagnosis Date    Arthritis     mainly knees, off and on    Coronary artery disease of native artery of native heart [facility]    1 stent - RCA - Dr. Aury Sloan    DM [facility]diabetes mellitus) [facility] 12/10/2012    pre-diabetes    ED (erectile dysfunction) 2009    HTN 2009    Hypercholesterolemia     Hyperlipemia 2009     Past Surgical History:   Procedure Laterality Date    CARDIAC SURG PROCEDURE UNLIST  2016    1 stent    HX CORONARY STENT PLACEMENT      HX HEART CATHETERIZATION      HX PTCA       Family History   Problem Relation Age of Onset    Heart Disease Mother         premature    Heart Attack Mother     Hypertension Father     Stroke Father     Hypertension Sister     Diabetes Sister     Stroke Sister     No Known Problems Brother     Diabetes Sister     Cancer Neg Hx      Social History     Socioeconomic History    Marital status:      Spouse name: Not on file    Number of children: Not on file    Years of education: Not on file    Highest education level: Not on file   Occupational History    Not on file   Social Needs    Financial resource strain: Not on file    Food insecurity     Worry: Not on file     Inability: Not on file    Transportation needs     Medical: Not on file     Non-medical: Not on file   Tobacco Use    Smoking status: Former Smoker     Types: Cigars     Last attempt to quit: 2004     Years since quittin.1    Smokeless tobacco: Never Used    Tobacco comment: 2-3 times a year   Substance and Sexual Activity    Alcohol use:  Yes     Alcohol/week: 0.8 standard drinks     Types: 1 Cans of beer per week    Drug use: Never    Sexual activity: Yes     Partners: Female   Lifestyle    Physical activity     Days per week: Not on file     Minutes per session: Not on file    Stress: Not on file   Relationships    Social connections     Talks on phone: Not on file     Gets together: Not on file     Attends Church service: Not on file     Active member of club or organization: Not on file     Attends meetings of clubs or organizations: Not on file     Relationship status: Not on file    Intimate partner violence     Fear of current or ex partner: Not on file     Emotionally abused: Not on file     Physically abused: Not on file     Forced sexual activity: Not on file   Other Topics Concern    Not on file   Social History Narrative    Not on file         Prior to Admission medications    Medication Sig Start Date End Date Taking? Authorizing Provider   pneumococcal 23-valent (PNEUMOVAX 23) 25 mcg/0.5 mL injection 0.5 mL by IntraMUSCular route once for 1 dose. 6/23/20 6/23/20 Yes Niecy Aaron MD   varicella-zoster recombinant, PF, [facility]) 50 mcg/0.5 mL susr injection 0.5 mL by IntraMUSCular route once for 1 dose. Repeat second dose in 6 months. 6/23/20 6/23/20 Yes Niecy Aaron MD   carvediloL (COREG) 6.25 mg tablet TAKE 1 TABLET BY MOUTH TWICE A DAY 6/15/20  Yes Niecy Aaron MD   atorvastatin (LIPITOR) 20 mg tablet TAKE 1 TABLET BY MOUTH EVERY DAY 9/30/19  Yes Niecy Aaron MD   amLODIPine (NORVASC) 5 mg tablet Take 1 Tab by mouth daily. 9/30/19  Yes Niecy Aaron MD   enalapril (VASOTEC) 20 mg tablet Take 1 Tab by mouth two (2) times a day. 9/30/19  Yes Niecy Aaron MD   hydroCHLOROthiazide (HYDRODIURIL) 25 mg tablet Take 1 Tab by mouth daily. 9/30/19  Yes Niecy Aaron MD   aspirin delayed-release 81 mg tablet Take 81 mg by mouth daily. Yes Provider, Historical   naproxen sodium (ALEVE) 220 mg tablet Take 220 mg by mouth two (2) times daily as needed for Pain. Lexy Henriquez MD        Allergies   Allergen Reactions    Nifedipine Other (comments)     Caused terrible feeling/not himself/drowsey     Quinine Itching       Vitals:    06/23/20 1146 06/23/20 1153   BP: 151/77 129/77   Pulse: (!) 57    Resp: 16    Temp: 98.6 °F (37 °C)    SpO2: 100%    Weight: 234 lb (106.1 kg)    Height: 6' 1\" (1.854 m)      Body mass index is 30.87 kg/m².       Objective  Physical Exam  General: Patient alert and oriented and in NAD  General Well appearing, A&O X 4  Neck without nodes normal thyroid  Lungs clear to ausculation  CV RRR, No M, R, or G. No edema. Neuro Normal Speech  Psych Oriented to person place and time with normal affect and mood.   No hallucinations or abnormal thought  Skin: No rashes or lesions noted on exposed skin

## 2020-07-16 DIAGNOSIS — I10 ESSENTIAL HYPERTENSION: ICD-10-CM

## 2020-07-16 RX ORDER — HYDROCHLOROTHIAZIDE 25 MG/1
TABLET ORAL
Qty: 90 TAB | Refills: 1 | Status: SHIPPED | OUTPATIENT
Start: 2020-07-16 | End: 2021-04-12

## 2020-07-20 DIAGNOSIS — E78.5 HYPERLIPIDEMIA, UNSPECIFIED HYPERLIPIDEMIA TYPE: ICD-10-CM

## 2020-07-20 RX ORDER — ATORVASTATIN CALCIUM 20 MG/1
TABLET, FILM COATED ORAL
Qty: 90 TAB | Refills: 1 | Status: SHIPPED | OUTPATIENT
Start: 2020-07-20 | End: 2021-03-01

## 2020-08-18 DIAGNOSIS — I10 ESSENTIAL HYPERTENSION WITH GOAL BLOOD PRESSURE LESS THAN 130/80: ICD-10-CM

## 2020-08-18 RX ORDER — AMLODIPINE BESYLATE 5 MG/1
TABLET ORAL
Qty: 90 TAB | Refills: 1 | Status: SHIPPED | OUTPATIENT
Start: 2020-08-18 | End: 2021-05-27

## 2021-04-06 ENCOUNTER — PATIENT MESSAGE (OUTPATIENT)
Dept: FAMILY MEDICINE CLINIC | Age: 61
End: 2021-04-06

## 2021-04-06 NOTE — TELEPHONE ENCOUNTER
[employer] message sent on behalf of [employer] chart review for overdue or Hemoglobin A1C's over 9%.

## 2021-05-26 DIAGNOSIS — I10 ESSENTIAL HYPERTENSION WITH GOAL BLOOD PRESSURE LESS THAN 130/80: ICD-10-CM

## 2021-05-27 RX ORDER — AMLODIPINE BESYLATE 5 MG/1
TABLET ORAL
Qty: 90 TABLET | Refills: 1 | Status: SHIPPED | OUTPATIENT
Start: 2021-05-27 | End: 2021-06-14 | Stop reason: SDUPTHER

## 2021-06-14 ENCOUNTER — OFFICE VISIT (OUTPATIENT)
Dept: FAMILY MEDICINE CLINIC | Age: 61
End: 2021-06-14
Payer: COMMERCIAL

## 2021-06-14 VITALS
RESPIRATION RATE: 16 BRPM | SYSTOLIC BLOOD PRESSURE: 136 MMHG | DIASTOLIC BLOOD PRESSURE: 81 MMHG | HEIGHT: 73 IN | HEART RATE: 62 BPM | TEMPERATURE: 98.2 F | OXYGEN SATURATION: 99 % | WEIGHT: 227 LBS | BODY MASS INDEX: 30.09 KG/M2

## 2021-06-14 DIAGNOSIS — R73.02 GLUCOSE INTOLERANCE (IMPAIRED GLUCOSE TOLERANCE): Primary | ICD-10-CM

## 2021-06-14 DIAGNOSIS — I10 ESSENTIAL HYPERTENSION WITH GOAL BLOOD PRESSURE LESS THAN 130/80: ICD-10-CM

## 2021-06-14 DIAGNOSIS — I25.118 CORONARY ARTERY DISEASE OF NATIVE ARTERY OF NATIVE HEART WITH STABLE ANGINA PECTORIS (HCC): ICD-10-CM

## 2021-06-14 DIAGNOSIS — Z12.11 SCREENING FOR COLON CANCER: ICD-10-CM

## 2021-06-14 DIAGNOSIS — I10 ESSENTIAL HYPERTENSION: ICD-10-CM

## 2021-06-14 DIAGNOSIS — E78.5 HYPERLIPIDEMIA, UNSPECIFIED HYPERLIPIDEMIA TYPE: ICD-10-CM

## 2021-06-14 LAB
ALBUMIN SERPL-MCNC: 4.1 G/DL (ref 3.5–5)
ALBUMIN/GLOB SERPL: 1.4 {RATIO} (ref 1.1–2.2)
ALP SERPL-CCNC: 59 U/L (ref 45–117)
ALT SERPL-CCNC: 14 U/L (ref 12–78)
ANION GAP SERPL CALC-SCNC: 4 MMOL/L (ref 5–15)
AST SERPL-CCNC: 12 U/L (ref 15–37)
BASOPHILS # BLD: 0 K/UL (ref 0–0.1)
BASOPHILS NFR BLD: 0 % (ref 0–1)
BILIRUB DIRECT SERPL-MCNC: 0.3 MG/DL (ref 0–0.2)
BILIRUB SERPL-MCNC: 1.1 MG/DL (ref 0.2–1)
BUN SERPL-MCNC: 19 MG/DL (ref 6–20)
BUN/CREAT SERPL: 20 (ref 12–20)
CALCIUM SERPL-MCNC: 9.7 MG/DL (ref 8.5–10.1)
CHLORIDE SERPL-SCNC: 104 MMOL/L (ref 97–108)
CHOLEST SERPL-MCNC: 141 MG/DL
CO2 SERPL-SCNC: 30 MMOL/L (ref 21–32)
CREAT SERPL-MCNC: 0.93 MG/DL (ref 0.7–1.3)
CREAT UR-MCNC: 168 MG/DL
DIFFERENTIAL METHOD BLD: NORMAL
EOSINOPHIL # BLD: 0 K/UL (ref 0–0.4)
EOSINOPHIL NFR BLD: 1 % (ref 0–7)
ERYTHROCYTE [DISTWIDTH] IN BLOOD BY AUTOMATED COUNT: 14.3 % (ref 11.5–14.5)
EST. AVERAGE GLUCOSE BLD GHB EST-MCNC: 111 MG/DL
GLOBULIN SER CALC-MCNC: 2.9 G/DL (ref 2–4)
GLUCOSE SERPL-MCNC: 97 MG/DL (ref 65–100)
HBA1C MFR BLD: 5.5 % (ref 4–5.6)
HCT VFR BLD AUTO: 46 % (ref 36.6–50.3)
HDLC SERPL-MCNC: 67 MG/DL
HDLC SERPL: 2.1 {RATIO} (ref 0–5)
HGB BLD-MCNC: 14.6 G/DL (ref 12.1–17)
IMM GRANULOCYTES # BLD AUTO: 0 K/UL (ref 0–0.04)
IMM GRANULOCYTES NFR BLD AUTO: 0 % (ref 0–0.5)
LDLC SERPL CALC-MCNC: 63.2 MG/DL (ref 0–100)
LYMPHOCYTES # BLD: 1.7 K/UL (ref 0.8–3.5)
LYMPHOCYTES NFR BLD: 30 % (ref 12–49)
MCH RBC QN AUTO: 30.6 PG (ref 26–34)
MCHC RBC AUTO-ENTMCNC: 31.7 G/DL (ref 30–36.5)
MCV RBC AUTO: 96.4 FL (ref 80–99)
MICROALBUMIN UR-MCNC: 1.48 MG/DL
MICROALBUMIN/CREAT UR-RTO: 9 MG/G (ref 0–30)
MONOCYTES # BLD: 0.6 K/UL (ref 0–1)
MONOCYTES NFR BLD: 10 % (ref 5–13)
NEUTS SEG # BLD: 3.4 K/UL (ref 1.8–8)
NEUTS SEG NFR BLD: 59 % (ref 32–75)
NRBC # BLD: 0 K/UL (ref 0–0.01)
NRBC BLD-RTO: 0 PER 100 WBC
PLATELET # BLD AUTO: 195 K/UL (ref 150–400)
PMV BLD AUTO: 12.6 FL (ref 8.9–12.9)
POTASSIUM SERPL-SCNC: 4.1 MMOL/L (ref 3.5–5.1)
PROT SERPL-MCNC: 7 G/DL (ref 6.4–8.2)
RBC # BLD AUTO: 4.77 M/UL (ref 4.1–5.7)
SODIUM SERPL-SCNC: 138 MMOL/L (ref 136–145)
TRIGL SERPL-MCNC: 54 MG/DL (ref ?–150)
VLDLC SERPL CALC-MCNC: 10.8 MG/DL
WBC # BLD AUTO: 5.8 K/UL (ref 4.1–11.1)

## 2021-06-14 PROCEDURE — 99214 OFFICE O/P EST MOD 30 MIN: CPT | Performed by: FAMILY MEDICINE

## 2021-06-14 RX ORDER — AMLODIPINE BESYLATE 5 MG/1
TABLET ORAL
Qty: 90 TABLET | Refills: 1 | Status: SHIPPED | OUTPATIENT
Start: 2021-06-14 | End: 2022-05-11

## 2021-06-14 RX ORDER — ENALAPRIL MALEATE 20 MG/1
20 TABLET ORAL 2 TIMES DAILY
Qty: 180 TABLET | Refills: 1 | Status: SHIPPED | OUTPATIENT
Start: 2021-06-14 | End: 2021-12-17

## 2021-06-14 RX ORDER — HYDROCHLOROTHIAZIDE 25 MG/1
TABLET ORAL
Qty: 90 TABLET | Refills: 1 | Status: SHIPPED | OUTPATIENT
Start: 2021-06-14 | End: 2022-04-15

## 2021-06-14 RX ORDER — CARVEDILOL 6.25 MG/1
TABLET ORAL
Qty: 180 TABLET | Refills: 1 | Status: SHIPPED | OUTPATIENT
Start: 2021-06-14 | End: 2021-12-17

## 2021-06-14 RX ORDER — ATORVASTATIN CALCIUM 20 MG/1
TABLET, FILM COATED ORAL
Qty: 90 TABLET | Refills: 1 | Status: SHIPPED | OUTPATIENT
Start: 2021-06-14 | End: 2022-04-18

## 2021-06-14 NOTE — PROGRESS NOTES
Identified pt with two pt identifiers(name and ). Reviewed record in preparation for visit and have obtained necessary documentation. Chief Complaint   Patient presents with    Hypertension    Medication Refill        Health Maintenance Due   Topic    COVID-19 Vaccine (1)    Shingrix Vaccine Age 49> (1 of 2)    Colorectal Cancer Screening Combo     Lipid Screen        Coordination of Care Questionnaire:  :   1) Have you been to an emergency room, urgent care, or hospitalized since your last visit? If yes, where when, and reason for visit?  no       2. Have seen or consulted any other health care provider since your last visit? If yes, where when, and reason for visit?   No

## 2021-06-14 NOTE — PROGRESS NOTES
Diana Albright  64 y.o. male  1960  HQF:[id number]  [facility]  Progress Note     Encounter Date: 6/14/2021    Assessment and Plan:     Encounter Diagnoses     ICD-10-CM ICD-9-CM   1. Glucose intolerance (impaired glucose tolerance)  R73.02 790.22   2. Essential hypertension with goal blood pressure less than 130/80  I10 401.9   3. Coronary artery disease of native artery of native heart with stable angina pectoris ([facility] [address]  I25.118 414.01     413.9   4. Essential hypertension  I10 401.9   5. Hyperlipidemia, unspecified hyperlipidemia type  E78.5 272.4   6. Screening for colon cancer  Z12.11 V76.51       1. Essential hypertension   At goal  - carvediloL (COREG) 6.25 mg tablet; TAKE 1 TABLET BY MOUTH TWICE A DAY  Dispense: 180 Tablet; Refill: 1  - enalapril (VASOTEC) 20 mg tablet; Take 1 Tablet by mouth two (2) times a day. Dispense: 180 Tablet; Refill: 1  - amLODIPine (NORVASC) 5 mg tablet; TAKE 1 TABLET BY MOUTH EVERY DAY  Dispense: 90 Tablet; Refill: 1    2. Coronary artery disease of native artery of native heart with stable angina pectoris (HCC)  Pain free. Sees Cardiology in near future  - carvediloL (COREG) 6.25 mg tablet; TAKE 1 TABLET BY MOUTH TWICE A DAY  Dispense: 180 Tablet; Refill: 1    3. Essential hypertension  At goal  - hydroCHLOROthiazide (HYDRODIURIL) 25 mg tablet; TAKE 1 TABLET BY MOUTH EVERY DAY  Dispense: 90 Tablet; Refill: 1  - METABOLIC PANEL, BASIC; Future  - MICROALBUMIN, UR, RAND W/ MICROALB/CREAT RATIO; Future    4. Hyperlipidemia, unspecified hyperlipidemia type  Check status, continue statin  - atorvastatin (LIPITOR) 20 mg tablet; TAKE 1 TABLET BY MOUTH EVERY DAY  Dispense: 90 Tablet; Refill: 1  - HEPATIC FUNCTION PANEL; Future  - LIPID PANEL; Future    5. Glucose intolerance (impaired glucose tolerance)  Doing well with weight loss and diet. - CBC WITH AUTOMATED DIFF; Future  - HEMOGLOBIN A1C WITH EAG; Future    6.  Screening for colon cancer  Still needs colonoscopy. To Dr. Card.  - Wander Dixon      I have discussed the diagnosis with the patient and the intended plan as seen in the above orders. he has expressed understanding. The patient has received an after-visit summary and questions were answered concerning future plans. I have discussed medication side effects and warnings with the patient as well. Electronically Signed: Shan Gonzalez MD    Current Medications after this visit     Current Outpatient Medications   Medication Sig    carvediloL (COREG) 6.25 mg tablet TAKE 1 TABLET BY MOUTH TWICE A DAY    enalapril (VASOTEC) 20 mg tablet Take 1 Tablet by mouth two (2) times a day.  hydroCHLOROthiazide (HYDRODIURIL) 25 mg tablet TAKE 1 TABLET BY MOUTH EVERY DAY    atorvastatin (LIPITOR) 20 mg tablet TAKE 1 TABLET BY MOUTH EVERY DAY    amLODIPine (NORVASC) 5 mg tablet TAKE 1 TABLET BY MOUTH EVERY DAY    naproxen sodium (ALEVE) 220 mg tablet Take 220 mg by mouth two (2) times daily as needed for Pain.  aspirin delayed-release 81 mg tablet Take 81 mg by mouth daily. No current facility-administered medications for this visit. Medications Discontinued During This Encounter   Medication Reason    enalapril (VASOTEC) 20 mg tablet REORDER    carvediloL (COREG) 6.25 mg tablet REORDER    atorvastatin (LIPITOR) 20 mg tablet REORDER    hydroCHLOROthiazide (HYDRODIURIL) 25 mg tablet REORDER    amLODIPine (NORVASC) 5 mg tablet REORDER     ~~~~~~~~~~~~~~~~~~~~~~~~~~~~~~~~~~~~~~~~~~~~~~~~~~~~~~~~~~~    Chief Complaint   Patient presents with    Hypertension    Medication Refill       History provided by patient  History of Present Illness   Feliz Cuevas is a 64 y.o. male who presents to clinic today for:  Hypertension and Medication Refill    Hypertension  At goal  Needs meds  Takes consistently  Walks daily without dyspnea or chest pain. Due to see Cardiology later this summer.     Needs referral for colonoscopy    Hypercholesterolemia on statin  Needs labs. Health Maintenance  Completed HM gaps at today's visit  Health Maintenance Due   Topic Date Due    COVID-19 Vaccine (1) Never done    Shingrix Vaccine Age 50> (1 of 2) Never done    Colorectal Cancer Screening Combo  Never done    Lipid Screen  11/29/2018     Review of Systems   Review of Systems   Constitutional: Negative for chills, fever and weight loss. Respiratory: Negative for cough and shortness of breath. Cardiovascular: Negative for leg swelling. Psychiatric/Behavioral: Negative. Vitals/Objective:     Vitals:    06/14/21 0836   BP: 136/81   Pulse: 62   Resp: 16   Temp: 98.2 °F (36.8 °C)   TempSrc: Temporal   SpO2: 99%   Weight: 227 lb (103 kg)   Height: 6' 1\" (1.854 m)     Body mass index is 29.95 kg/m². Wt Readings from Last 3 Encounters:   06/14/21 227 lb (103 kg)   06/23/20 234 lb (106.1 kg)   09/30/19 235 lb (106.6 kg)         Objective  Physical Exam  Vitals and nursing note reviewed. Constitutional:       Appearance: Normal appearance. He is not toxic-appearing. HENT:      Head: Normocephalic and atraumatic. Cardiovascular:      Rate and Rhythm: Normal rate and regular rhythm. Heart sounds: Normal heart sounds. No murmur heard. No gallop. Pulmonary:      Effort: Pulmonary effort is normal. No respiratory distress. Breath sounds: Normal breath sounds. No wheezing, rhonchi or rales. Musculoskeletal:      Cervical back: No muscular tenderness. Lymphadenopathy:      Cervical: No cervical adenopathy. Neurological:      Mental Status: He is alert. Psychiatric:         Mood and Affect: Mood normal.         Behavior: Behavior normal.         Thought Content: Thought content normal.         Judgment: Judgment normal.           No results found for this or any previous visit (from the past 24 hour(s)). Disposition     No future appointments.     History   Patient's past medical, surgical and family histories were reviewed and updated. Past Medical History:   Diagnosis Date    Arthritis     mainly knees, off and on    Coronary artery disease of native artery of native heart with stable angina pectoris ([address] 2016    1 stent - RCA - Dr. Alexis Bob DM (diabetes mellitus) ([address] 12/10/2012    pre-diabetes    ED (erectile dysfunction) 2009    HTN 2009    Hypercholesterolemia     Hyperlipemia 2009     Past Surgical History:   Procedure Laterality Date    HX CORONARY STENT PLACEMENT      HX HEART CATHETERIZATION      HX PTCA      [place] CARDIAC SURG PROCEDURE UNLIST  2016    1 stent     Family History   Problem Relation Age of Onset    Heart Disease Mother         premature    Heart Attack Mother     Hypertension Father     Stroke Father     Hypertension Sister     Diabetes Sister     Stroke Sister     No Known Problems Brother     Diabetes Sister     Cancer Neg Hx      Social History     Tobacco Use    Smoking status: Former Smoker     Types: Cigars     Quit date: 2004     Years since quittin.1    Smokeless tobacco: Never Used    Tobacco comment: 2-3 times a year   Substance Use Topics    Alcohol use:  Yes     Alcohol/week: 0.8 standard drinks     Types: 1 Cans of beer per week    Drug use: Never       Allergies     Allergies   Allergen Reactions    Nifedipine Other (comments)     Caused terrible feeling/not himself/drowsey     Quinine Itching

## 2021-06-14 NOTE — PATIENT INSTRUCTIONS
Diabetes:  Blood sugar goals:  Hemoglobin A1c under 7  Fasting blood sugar   Blood sugar 2 hours after a meal under 180, 4 hours after a meal under 120  No hypoglycemia (sugars under 70 and symptomatic low sugars)    Blood sugar control with diet and exercise:  Exercise 45 minutes per day. This makes your insulin work better. It also allows insulin levels to fall helping with weight loss. Every night, try to fast from your evening meal to breakfast (at least 12 hours) without eating anything. This uses stored energy in your liver and makes insulin work better. Avoid simples sugars such as table sugar in drinks (sodas, lemonade, sweet tea, wine), desserts, candy. Also avoid fruit juices and high fructose corn syrup. Avoid frequent consumption of fruit especially grapes and bananas. A single serving of fruit daily is all you should have. Finally, drink less milk which has milk sugar in it. Control your starch intake. Men should have 3-4 carb portions per meal.  Women should have 2-3 carb portions per meal.  A carb serving is the equivalent of a slice of bread. Control your blood pressure and cholesterol. These problems are common in diabetes. AND, don't smoke. All of these problems contribute to heart disease and stroke risk. Get a yearly eye exam and flu shot. Make sure your vaccines are up to date particularly the pneumonia vaccines. Inspect your feet daily. Wear comfortable protective shoes and clean socks. Seek medical care if there are sore, calluses or numbness and burning of your feet. See your doctor at least every 6 months if you are on oral medicines or more often if the diabetic control is not at goal or if you are on insulin. Take your medicines religiously. The essentials of losing weight and a diabetic lower carbohydrate diet. 1. Exercise  a. You should exercise 45- 60 minutes every day.   b. This reduces the stored energy in your muscles and allows your insulin level to fall.  c. You can only lose weight when your insulin level is low. Then you burn stored energy. 2.  Fasting   a. You should fast every night from 12-14 hours. b.  Philly Sanderson are still using energy at night when you are sleeping and will burn stored energy. c.  Fasting allows you burn stored energy in your internal organs such as the liver. This allows the insulin level to drop.   d.  This allows you to start losing weight. 3.  No sugar!   a. You should try to eliminate sugar from your diet. b.  First, eliminate sweet drinks including:  sodas and summer, sports drinks (like Gatorade or Poweraid), sweet tea and lemonade, wine (and beer), fruit juice (contains fruit sugar) and milk (contains milk sugar). c.  Eliminate sugary cereals, cookies and candies. No donuts, pastries or coffee cake. d.  Eat desserts only on special occasions:  family reunions, birthdays, anniversaries, major holidays. Eat only small desserts!   e. Start watching labels for foods that have added sugars. 4. Limit starches   a. Limit starches particularly:  bread, noodles, pasta, crackers and chips, rice, potatoes and fries. b.  Watch out for starchy vegetables:  corn and peas. c.  Women can have 30-45 grams of carbs per meal   d. Men can have 45-60 grams of carbs per meal   e. One piece of bread has 15-20 grams of carbs   f. One half cup of oatmeal, corn, rice, peas and cooked pasta have about 15 grams of carbs. 5.  Fruit-special case   a. Fruit has nutrients we need such as Vitamin C but also contains a lot of fruit sugar (fructose)   b. Fruit is not a good snack because fructose does not suppress your appetite. c.  Fruit can cause progression of fatty liver disease which makes weight loss harder   d. Limit yourself to one serving of fruit per day and try to eat fruit that is dark red, blue or purple.   e.  One serving of fruit is 1/2 to 3/4 of a cup.    6.  What do I eat instead?   a.   Eat protein. It curbs your appetite longer than carbs do anyway. Eat meat, chicken, fish and eggs. b.  Eat healthy fats:  fish, olive oil, nuts   c. Eat more vegetables and salads. d.  Eat all of the above before you eat any carbs.   e.  Eat slowly and enjoy your food. f.  Drink more water. 7.  Snacks   a. Good snacks:  Cheese, nuts, Kind bars (minis), Protein bars, carrot sticks, celery sticks. b.  Read labels and look for snacks with low amounts of carbohydrate per serving (10 or less)   c. Try not to eat between meals. You'll lose more weight if you are not constantly putting energy into the system. 8.  Accountability   a. You have to keep yourself honest about your diet efforts. You need reminders to stick to your change in lifestyle and diet. b.  Weigh yourself daily   c.  Record your food intake either on an lizzette or in writing.   d.  Record your exercise. 9.  Support and emotional health. a.  Surround yourself with people that will help you and support your efforts. b. Avoid people that may sabotage your efforts or insist that they at least not undermine you.  c.  Be patient. An average patient loses only 3 pounds a month but that's 36 pounds at the end of a year. d.  Think long term. Try to keep up good exercise and diet habits. Once you get the weight off, keep it off.  e.  Pay attention to your emotions about food and your weight. Have a healthy attitude towards yourself and your body. 10.  When do I get to go back to eating the way I did before? Answer: Never. If you resume your old patterns of eating that caused your weight gain, you'll just gain the weight back. Try to make permanent changes in how you live every day. It's not easy but you can do it.

## 2021-06-15 NOTE — PROGRESS NOTES
No sign of kidney disease on the urine test!  Another good result.   [facility]

## 2021-06-15 NOTE — PROGRESS NOTES
Your blood work looks good. The cholesterol is good and the blood sugar is actually in a nondiabetic range. You're doing a great job with the diet and exercise. Keep it up. See me in 6 months and as needed.   [facility]

## 2022-03-19 PROBLEM — R79.89 ELEVATED LFTS: Status: ACTIVE | Noted: 2019-03-25

## 2022-03-19 PROBLEM — K80.20 SYMPTOMATIC CHOLELITHIASIS: Status: ACTIVE | Noted: 2019-03-26

## 2022-03-19 PROBLEM — R10.9 ABDOMINAL PAIN: Status: ACTIVE | Noted: 2019-03-25

## 2022-04-15 DIAGNOSIS — I10 ESSENTIAL HYPERTENSION WITH GOAL BLOOD PRESSURE LESS THAN 130/80: ICD-10-CM

## 2022-04-15 DIAGNOSIS — I25.118 CORONARY ARTERY DISEASE OF NATIVE ARTERY OF NATIVE HEART WITH STABLE ANGINA PECTORIS (HCC): ICD-10-CM

## 2022-04-15 DIAGNOSIS — E78.5 HYPERLIPIDEMIA, UNSPECIFIED HYPERLIPIDEMIA TYPE: ICD-10-CM

## 2022-04-16 DIAGNOSIS — I10 ESSENTIAL HYPERTENSION WITH GOAL BLOOD PRESSURE LESS THAN 130/80: ICD-10-CM

## 2022-04-18 RX ORDER — ENALAPRIL MALEATE 20 MG/1
TABLET ORAL
Qty: 180 TABLET | Refills: 1 | Status: SHIPPED | OUTPATIENT
Start: 2022-04-18

## 2022-04-18 RX ORDER — CARVEDILOL 6.25 MG/1
TABLET ORAL
Qty: 180 TABLET | Refills: 1 | Status: SHIPPED | OUTPATIENT
Start: 2022-04-18

## 2022-04-18 RX ORDER — ATORVASTATIN CALCIUM 20 MG/1
TABLET, FILM COATED ORAL
Qty: 90 TABLET | Refills: 1 | Status: SHIPPED | OUTPATIENT
Start: 2022-04-18 | End: 2022-10-12

## 2022-05-11 ENCOUNTER — OFFICE VISIT (OUTPATIENT)
Dept: FAMILY MEDICINE CLINIC | Age: 62
End: 2022-05-11
Payer: COMMERCIAL

## 2022-05-11 VITALS
SYSTOLIC BLOOD PRESSURE: 137 MMHG | WEIGHT: 221.8 LBS | TEMPERATURE: 97.9 F | OXYGEN SATURATION: 100 % | RESPIRATION RATE: 18 BRPM | BODY MASS INDEX: 29.4 KG/M2 | DIASTOLIC BLOOD PRESSURE: 79 MMHG | HEIGHT: 73 IN | HEART RATE: 62 BPM

## 2022-05-11 DIAGNOSIS — E78.5 HYPERLIPIDEMIA, UNSPECIFIED HYPERLIPIDEMIA TYPE: ICD-10-CM

## 2022-05-11 DIAGNOSIS — I25.118 CORONARY ARTERY DISEASE OF NATIVE ARTERY OF NATIVE HEART WITH STABLE ANGINA PECTORIS (HCC): ICD-10-CM

## 2022-05-11 DIAGNOSIS — I10 ESSENTIAL HYPERTENSION WITH GOAL BLOOD PRESSURE LESS THAN 130/80: Primary | ICD-10-CM

## 2022-05-11 DIAGNOSIS — Z12.11 SCREENING FOR COLON CANCER: ICD-10-CM

## 2022-05-11 DIAGNOSIS — R73.02 GLUCOSE INTOLERANCE (IMPAIRED GLUCOSE TOLERANCE): ICD-10-CM

## 2022-05-11 PROCEDURE — 99214 OFFICE O/P EST MOD 30 MIN: CPT | Performed by: FAMILY MEDICINE

## 2022-05-11 RX ORDER — AMLODIPINE BESYLATE 5 MG/1
TABLET ORAL
Qty: 90 TABLET | Refills: 1 | Status: SHIPPED | OUTPATIENT
Start: 2022-05-11

## 2022-05-11 NOTE — PROGRESS NOTES
Chief Complaint   Patient presents with    Follow-up     1. Have you been to the ER, urgent care clinic since your last visit? Hospitalized since your last visit? No     2. Have you seen or consulted any other health care providers outside of the [address] [place] [address] since your last visit? Include any pap smears or colon screening.  No

## 2022-05-11 NOTE — PROGRESS NOTES
[place]  63 y.o. male  1960  [facility]  [facility] MEDICINE  Progress Note     Encounter Date: 2022    Assessment and Plan:     Encounter Diagnoses     ICD-10-CM ICD-9-CM   1. Essential hypertension with goal blood pressure less than 130/80  I10 401.9   2. Hyperlipidemia, unspecified hyperlipidemia type  E78.5 272.4   3. Coronary artery disease of native artery of native heart with stable angina pectoris ([facility] [address]  I25.118 414.01     413.9   4. Glucose intolerance (impaired glucose tolerance)  R73.02 790.22   5. Screening for colon cancer  Z12.11 V76.51       1. Essential hypertension with goal blood pressure less than 130/80  At goal  - METABOLIC PANEL, BASIC  - MICROALBUMIN, UR, RAND W/ MICROALB/CREAT RATIO  - amLODIPine (NORVASC) 5 mg tablet; TAKE 1 TABLET BY MOUTH EVERY DAY  Indications: high blood pressure  Dispense: 90 Tablet; Refill: 1    2. Hyperlipidemia, unspecified hyperlipidemia type  Check status  Continue statin  - HEPATIC FUNCTION PANEL  - LIPID PANEL    3. Coronary artery disease of native artery of native heart with stable angina pectoris (HCC)  Pain free. Walking. Takes meds consistently    4. Glucose intolerance (impaired glucose tolerance)  Check status  - CBC WITH AUTOMATED DIFF  - HEMOGLOBIN A1C WITH EAG    5. Screening for colon cancer  To Dr. Kenia Link  Patient states he is ready to pursue testing.  - REFERRAL TO GASTROENTEROLOGY      I have discussed the diagnosis with the patient and the intended plan as seen in the above orders. he has expressed understanding. The patient has received an after-visit summary and questions were answered concerning future plans. I have discussed medication side effects and warnings with the patient as well.     Electronically Signed: Keenan Patricia MD    Current Medications after this visit     Current Outpatient Medications   Medication Sig    amLODIPine (NORVASC) 5 mg tablet TAKE 1 TABLET BY MOUTH EVERY DAY Indications: high blood pressure    atorvastatin (LIPITOR) 20 mg tablet TAKE 1 TABLET BY MOUTH EVERY DAY    carvediloL (COREG) 6.25 mg tablet TAKE 1 TABLET BY MOUTH TWICE A DAY    enalapril (VASOTEC) 20 mg tablet TAKE 1 TABLET BY MOUTH TWO TIMES A DAY.  hydroCHLOROthiazide (HYDRODIURIL) 25 mg tablet TAKE 1 TABLET BY MOUTH EVERY DAY    naproxen sodium (ALEVE) 220 mg tablet Take 220 mg by mouth two (2) times daily as needed for Pain.  aspirin delayed-release 81 mg tablet Take 81 mg by mouth daily. No current facility-administered medications for this visit. Medications Discontinued During This Encounter   Medication Reason    amLODIPine (NORVASC) 5 mg tablet      ~~~~~~~~~~~~~~~~~~~~~~~~~~~~~~~~~~~~~~~~~~~~~~~~~~~~~~~~~~~    Chief Complaint   Patient presents with    Follow-up       History provided by patient  History of Present Illness   Jag Reyez is a 63 y.o. male who presents to clinic today for:  Follow-up    Hypertension  Takes meds every day. Never misses  At goal    CAD  No chest pain  Walks daily 3 miles  Lost 6 pounds  Sees Dr. Lamb Ser    Hypercholesterolemia  On statin. Health Maintenance  Completed HM [facility] at today's visit  Health Maintenance Due   Topic Date Due    COVID-19 Vaccine (1) Never done    Colorectal Cancer Screening Combo  Never done    Shingrix Vaccine Age 50> (1 of 2) Never done    DTaP/Tdap/Td series (2 - Td or Tdap) 01/13/2022     Review of Systems   Review of Systems   Constitutional: Positive for weight loss. Respiratory: Negative for shortness of breath. Cardiovascular: Negative for chest pain. Gastrointestinal: Negative for blood in stool. Genitourinary: Negative for hematuria. Psychiatric/Behavioral: Negative.          Vitals/Objective:     Vitals:    05/11/22 0951 05/11/22 0953   BP: (!) 143/88 137/79   Pulse: 62    Resp: 18    Temp: 97.9 °F (36.6 °C)    TempSrc: Temporal    SpO2: 100%    Weight: 221 lb 12.8 oz (100.6 kg)    Height: 6' 1\" (1.854 m)      Body mass index is 29.26 kg/m². Wt Readings from Last 3 Encounters:   05/11/22 221 lb 12.8 oz (100.6 kg)   06/14/21 227 lb (103 kg)   06/23/20 234 lb (106.1 kg)         Objective  Physical Exam  Vitals and nursing note reviewed. Constitutional:       Appearance: Normal appearance. He is not toxic-appearing. HENT:      Head: Normocephalic and atraumatic. Cardiovascular:      Rate and Rhythm: Normal rate and regular rhythm. Heart sounds: Normal heart sounds. No murmur heard. No gallop. Pulmonary:      Effort: Pulmonary effort is normal. No respiratory distress. Breath sounds: Normal breath sounds. No wheezing, rhonchi or rales. Musculoskeletal:      Cervical back: No muscular tenderness. Lymphadenopathy:      Cervical: No cervical adenopathy. Neurological:      Mental Status: He is alert. Psychiatric:         Mood and Affect: Mood normal.         Behavior: Behavior normal.         Thought Content: Thought content normal.         Judgment: Judgment normal.           No results found for this or any previous visit (from the past 24 hour(s)). Disposition     Follow-up and Dispositions  ·   Return in about 6 months (around 11/11/2022) for Blood pressure follow up, Diabetes follow up, Medication follow up, Annual exam.       No future appointments. History   Patient's past medical, surgical and family histories were reviewed and updated.     Past Medical History:   Diagnosis Date    Arthritis     mainly knees, off and on    Coronary artery disease of native artery of native heart with stable angina pectoris ([facility] [address] 9/13/2016    1 stent - RCA - Dr. Melody Landau    DM (diabetes mellitus) ([facility] [address] 12/10/2012    pre-diabetes    ED (erectile dysfunction) 4/17/2009    HTN 4/17/2009    Hypercholesterolemia     Hyperlipemia 4/17/2009     Past Surgical History:   Procedure Laterality Date    HX CORONARY STENT PLACEMENT      HX HEART CATHETERIZATION      HX PTCA      [place] CARDIAC SURG PROCEDURE UNLIST  2016    1 stent     Family History   Problem Relation Age of Onset    Heart Disease Mother         premature    Heart Attack Mother     Hypertension Father    Phan Stroke Father     Hypertension Sister     Diabetes Sister     Stroke Sister     No Known Problems Brother     Diabetes Sister     Cancer Neg Hx      Social History     Tobacco Use    Smoking status: Former Smoker     Types: Cigars     Quit date: 2004     Years since quittin.0    Smokeless tobacco: Never Used    Tobacco comment: 2-3 times a year   Substance Use Topics    Alcohol use:  Yes     Alcohol/week: 0.8 standard drinks     Types: 1 Cans of beer per week    Drug use: Never       Allergies     Allergies   Allergen Reactions    Nifedipine Other (comments)     Caused terrible feeling/not himself/drowsey     Quinine Itching

## 2022-05-12 LAB
ALBUMIN SERPL-MCNC: 4.7 G/DL (ref 3.8–4.9)
ALBUMIN/CREAT UR: 11 MG/G CREAT (ref 0–29)
ALP SERPL-CCNC: 66 IU/L (ref 44–121)
ALT SERPL-CCNC: 11 IU/L (ref 0–44)
AST SERPL-CCNC: 14 IU/L (ref 0–40)
BASOPHILS # BLD AUTO: 0 X10E3/UL (ref 0–0.2)
BASOPHILS NFR BLD AUTO: 0 %
BILIRUB DIRECT SERPL-MCNC: 0.24 MG/DL (ref 0–0.4)
BILIRUB SERPL-MCNC: 0.8 MG/DL (ref 0–1.2)
BUN SERPL-MCNC: 13 MG/DL (ref 6–24)
BUN/CREAT SERPL: 12 (ref 9–20)
CALCIUM SERPL-MCNC: 9.7 MG/DL (ref 8.7–10.2)
CHLORIDE SERPL-SCNC: 99 MMOL/L (ref 96–106)
CHOLEST SERPL-MCNC: 148 MG/DL (ref 100–199)
CO2 SERPL-SCNC: 23 MMOL/L (ref 20–29)
CREAT SERPL-MCNC: 1.09 MG/DL (ref 0.76–1.27)
CREAT UR-MCNC: 291.3 MG/DL
EGFR: 78 ML/MIN/1.73
EOSINOPHIL # BLD AUTO: 0 X10E3/UL (ref 0–0.4)
EOSINOPHIL NFR BLD AUTO: 1 %
ERYTHROCYTE [DISTWIDTH] IN BLOOD BY AUTOMATED COUNT: 13.3 % (ref 11.6–15.4)
EST. AVERAGE GLUCOSE BLD GHB EST-MCNC: 123 MG/DL
GLUCOSE SERPL-MCNC: 91 MG/DL (ref 65–99)
HBA1C MFR BLD: 5.9 % (ref 4.8–5.6)
HCT VFR BLD AUTO: 46.1 % (ref 37.5–51)
HDLC SERPL-MCNC: 60 MG/DL
HGB BLD-MCNC: 15.6 G/DL (ref 13–17.7)
IMM GRANULOCYTES # BLD AUTO: 0 X10E3/UL (ref 0–0.1)
IMM GRANULOCYTES NFR BLD AUTO: 0 %
LDLC SERPL CALC-MCNC: 72 MG/DL (ref 0–99)
LYMPHOCYTES # BLD AUTO: 1.3 X10E3/UL (ref 0.7–3.1)
LYMPHOCYTES NFR BLD AUTO: 31 %
MCH RBC QN AUTO: 31.3 PG (ref 26.6–33)
MCHC RBC AUTO-ENTMCNC: 33.8 G/DL (ref 31.5–35.7)
MCV RBC AUTO: 93 FL (ref 79–97)
MICROALBUMIN UR-MCNC: 33.1 UG/ML
MONOCYTES # BLD AUTO: 0.8 X10E3/UL (ref 0.1–0.9)
MONOCYTES NFR BLD AUTO: 18 %
NEUTROPHILS # BLD AUTO: 2.1 X10E3/UL (ref 1.4–7)
NEUTROPHILS NFR BLD AUTO: 50 %
PLATELET # BLD AUTO: 203 X10E3/UL (ref 150–450)
POTASSIUM SERPL-SCNC: 4.6 MMOL/L (ref 3.5–5.2)
PROT SERPL-MCNC: 7.1 G/DL (ref 6–8.5)
RBC # BLD AUTO: 4.98 X10E6/UL (ref 4.14–5.8)
SODIUM SERPL-SCNC: 140 MMOL/L (ref 134–144)
TRIGL SERPL-MCNC: 82 MG/DL (ref 0–149)
VLDLC SERPL CALC-MCNC: 16 MG/DL (ref 5–40)
WBC # BLD AUTO: 4.1 X10E3/UL (ref 3.4–10.8)

## 2022-05-15 NOTE — PROGRESS NOTES
Your blood work looks great. Keep working on [employer] and walking. See me in 6 months and as needed. See the colon doctor as we discussed. You're doing great. [facility]

## 2022-07-25 ENCOUNTER — OFFICE VISIT (OUTPATIENT)
Dept: FAMILY MEDICINE CLINIC | Age: 62
End: 2022-07-25
Payer: COMMERCIAL

## 2022-07-25 VITALS
OXYGEN SATURATION: 98 % | HEART RATE: 70 BPM | WEIGHT: 224.2 LBS | RESPIRATION RATE: 16 BRPM | BODY MASS INDEX: 29.72 KG/M2 | TEMPERATURE: 98.2 F | HEIGHT: 73 IN | DIASTOLIC BLOOD PRESSURE: 79 MMHG | SYSTOLIC BLOOD PRESSURE: 128 MMHG

## 2022-07-25 DIAGNOSIS — Z02.89 ENCOUNTER FOR COMPLETION OF FORM WITH PATIENT: Primary | ICD-10-CM

## 2022-07-25 DIAGNOSIS — Z11.1 TUBERCULOSIS SCREENING: ICD-10-CM

## 2022-07-25 PROCEDURE — 99213 OFFICE O/P EST LOW 20 MIN: CPT | Performed by: STUDENT IN AN ORGANIZED HEALTH CARE EDUCATION/TRAINING PROGRAM

## 2022-07-25 NOTE — PROGRESS NOTES
Assessment/Plan:     Diagnoses and all orders for this visit:    1. Encounter for completion of form with patient  -Patient's physical examination form for foster parenting completed in office today  -Patient's chronic conditions are stable and recent lab work unremarkable.  -Planning on fostering his two 15year-old nieces.  -Patient requested that we mail this form to  at [address] [place] [address]. -TB screening required and given patient is leaving town tomorrow and unable to have PPD read will obtain a QuantiFERON TB screen today for further evaluation. Patient is asymptomatic and denies any exposures. 2. Tuberculosis screening  -     QUANTIFERON-TB GOLD PLUS  -TB screening required and given patient is leaving town tomorrow and unable to have PPD read will obtain a QuantiFERON TB screen today for further evaluation. Patient is asymptomatic and denies any exposures.  -Will complete form and fax to desired location wants results are in        Discussed expected course/resolution/complications of diagnosis in detail with patient. Medication risks/benefits/costs/interactions/alternatives discussed with patient. Pt expressed understanding with the diagnosis and plan      Subjective:      Nathalia Henley is a 63 y.o. male who presents for had concerns including Other (Wants tb screening fill out form for medical records ) and Physical.     Current Outpatient Medications   Medication Sig Dispense Refill    amLODIPine (NORVASC) 5 mg tablet TAKE 1 TABLET BY MOUTH EVERY DAY  Indications: high blood pressure 90 Tablet 1    atorvastatin (LIPITOR) 20 mg tablet TAKE 1 TABLET BY MOUTH EVERY DAY 90 Tablet 1    carvediloL (COREG) 6.25 mg tablet TAKE 1 TABLET BY MOUTH TWICE A  Tablet 1    enalapril (VASOTEC) 20 mg tablet TAKE 1 TABLET BY MOUTH TWO TIMES A DAY.  180 Tablet 1    hydroCHLOROthiazide (HYDRODIURIL) 25 mg tablet TAKE 1 TABLET BY MOUTH EVERY DAY 90 Tablet 1    aspirin delayed-release 81 mg tablet Take 81 mg by mouth daily. Allergies   Allergen Reactions    Nifedipine Other (comments)     Caused terrible feeling/not himself/drowsey     Quinine Itching     Past Medical History:   Diagnosis Date    Arthritis     mainly knees, off and on    Coronary artery disease of native artery of native heart with stable angina pectoris ([facility] 2016    1 stent - RCA - Dr. Corbin Saeed    DM (diabetes mellitus) ([facility] 12/10/2012    pre-diabetes    ED (erectile dysfunction) 2009    HTN 2009    Hypercholesterolemia     Hyperlipemia 2009     Past Surgical History:   Procedure Laterality Date    HX CORONARY STENT PLACEMENT      HX HEART CATHETERIZATION      HX PTCA      [place] CARDIAC SURG PROCEDURE UNLIST  2016    1 stent     Family History   Problem Relation Age of Onset    Heart Disease Mother         premature    Heart Attack Mother     Hypertension Father     Stroke Father     Hypertension Sister     Diabetes Sister     Stroke Sister     No Known Problems Brother     Diabetes Sister     Cancer Neg Hx      Social History     Socioeconomic History    Marital status:      Spouse name: Not on file    Number of children: Not on file    Years of education: Not on file    Highest education level: Not on file   Occupational History    Not on file   Tobacco Use    Smoking status: Former     Types: Cigars     Quit date: 2004     Years since quittin.2    Smokeless tobacco: Never    Tobacco comments:     2-3 times a year   Substance and Sexual Activity    Alcohol use:  Yes     Alcohol/week: 0.8 standard drinks     Types: 1 Cans of beer per week    Drug use: Never    Sexual activity: Yes     Partners: Female   Other Topics Concern    Not on file   Social History Narrative    Not on file     Social Determinants of Health     Financial Resource Strain: Not on file   Food Insecurity: Not on file   Transportation Needs: Not on file Physical Activity: Not on file   Stress: Not on file   Social Connections: Not on file   Intimate Partner Violence: Not on file   Housing Stability: Not on file       Patient is a 72-year-old male who presents the office today to have a physical examination form completed for becoming a . Patient plans on fostering his two 15year-old nieces from [place] due to psychiatric concerns with their mother. Patient states he is physically fit and able to climb stairs and stay active without chest pain or shortness of breath. He states he walks 5 miles a day without difficulty. Patient has a history of coronary artery disease and stent placement years ago. He has been doing well since. His hypertension has been well controlled. He denies any medical hospitalizations, accidents, psychiatric history, counseling or handicapping conditions. He denies any chest pain, shortness of breath, headache, dizziness, nausea, vomiting, abdominal pain or any other acute concerns. He is in need of TB test screening however he is leaving out of town tomorrow and unable to get a PPD. .        ROS:   Review of Systems   Constitutional:  Negative for chills and fever. HENT:  Negative for congestion. Eyes:  Negative for blurred vision. Respiratory:  Negative for cough and shortness of breath. Cardiovascular:  Negative for chest pain, palpitations and leg swelling. Gastrointestinal:  Negative for abdominal pain, nausea and vomiting. Genitourinary:  Negative for dysuria. Neurological:  Negative for dizziness, tingling, weakness and headaches. Objective:   Visit Vitals  /79 (BP 1 Location: Left upper arm, BP Patient Position: Sitting, BP Cuff Size: Adult)   Pulse 70   Temp 98.2 °F (36.8 °C) (Temporal)   Resp 16   Ht 6' 1\" (1.854 m)   Wt 224 lb 3.2 oz (101.7 kg)   SpO2 98%   BMI 29.58 kg/m²         Vitals and Nurse Documentation reviewed.      Physical Exam  Constitutional:       General: He is not in acute distress. Appearance: Normal appearance. He is not toxic-appearing. HENT:      Head: Normocephalic and atraumatic. Right Ear: Tympanic membrane and ear canal normal.      Left Ear: Tympanic membrane and ear canal normal.      Nose: Nose normal.      Mouth/Throat:      Mouth: Mucous membranes are moist.      Pharynx: Oropharynx is clear. No oropharyngeal exudate or posterior oropharyngeal erythema. Eyes:      Conjunctiva/sclera: Conjunctivae normal.      Pupils: Pupils are equal, round, and reactive to light. Neck:      Thyroid: No thyromegaly. Cardiovascular:      Rate and Rhythm: Normal rate and regular rhythm. Pulses: Normal pulses. Heart sounds: Normal heart sounds. No murmur heard. No gallop. Pulmonary:      Effort: Pulmonary effort is normal. No respiratory distress. Breath sounds: Normal breath sounds. No wheezing or rhonchi. Abdominal:      General: Bowel sounds are normal. There is no distension. Palpations: Abdomen is soft. Tenderness: There is no abdominal tenderness. There is no guarding. Musculoskeletal:      Cervical back: Neck supple. Right lower leg: No edema. Left lower leg: No edema. Lymphadenopathy:      Cervical: No cervical adenopathy. Neurological:      Mental Status: He is alert and oriented to person, place, and time.       Gait: Gait normal.

## 2022-07-25 NOTE — PROGRESS NOTES
1. Have you been to the ER, urgent care clinic since your last visit? Hospitalized since your last visit? No    2. Have you seen or consulted any other health care providers outside of the [address] [place] [address] since your last visit? Include any pap smears or colon screening.  No        Chief Complaint   Patient presents with    Other     Wants tb screening fill out form for medical records

## 2022-08-01 LAB
GAMMA INTERFERON BACKGROUND BLD IA-ACNC: 0.1 IU/ML
M TB IFN-G BLD-IMP: NEGATIVE
M TB IFN-G CD4+ BCKGRND COR BLD-ACNC: 0.09 IU/ML
MITOGEN IGNF BLD-ACNC: >10 IU/ML
QUANTIFERON TB2 AG: 0.09 IU/ML
SERVICE CMNT-IMP: NORMAL

## 2022-08-02 ENCOUNTER — TELEPHONE (OUTPATIENT)
Dept: FAMILY MEDICINE CLINIC | Age: 62
End: 2022-08-02

## 2022-08-02 NOTE — TELEPHONE ENCOUNTER
Tuberculosis statement completed by Dr. De Los Santos [facility].  Form sent to [facility] via fax. Fax#: [phone]    Form placed at front office scan.

## 2022-10-12 DIAGNOSIS — E78.5 HYPERLIPIDEMIA, UNSPECIFIED HYPERLIPIDEMIA TYPE: ICD-10-CM

## 2022-10-12 RX ORDER — ATORVASTATIN CALCIUM 20 MG/1
TABLET, FILM COATED ORAL
Qty: 90 TABLET | Refills: 1 | Status: SHIPPED | OUTPATIENT
Start: 2022-10-12

## 2023-04-14 DIAGNOSIS — I10 ESSENTIAL HYPERTENSION: ICD-10-CM

## 2023-04-14 DIAGNOSIS — I10 ESSENTIAL HYPERTENSION WITH GOAL BLOOD PRESSURE LESS THAN 130/80: ICD-10-CM

## 2023-04-14 DIAGNOSIS — E78.5 HYPERLIPIDEMIA, UNSPECIFIED HYPERLIPIDEMIA TYPE: ICD-10-CM

## 2023-04-17 RX ORDER — HYDROCHLOROTHIAZIDE 25 MG/1
TABLET ORAL
Qty: 90 TABLET | Refills: 1 | Status: SHIPPED | OUTPATIENT
Start: 2023-04-17

## 2023-04-17 RX ORDER — AMLODIPINE BESYLATE 5 MG/1
TABLET ORAL
Qty: 90 TABLET | Refills: 1 | Status: SHIPPED | OUTPATIENT
Start: 2023-04-17

## 2023-04-17 RX ORDER — ATORVASTATIN CALCIUM 20 MG/1
TABLET, FILM COATED ORAL
Qty: 90 TABLET | Refills: 1 | Status: SHIPPED | OUTPATIENT
Start: 2023-04-17

## 2023-05-03 ENCOUNTER — OFFICE VISIT (OUTPATIENT)
Dept: FAMILY MEDICINE CLINIC | Age: 63
End: 2023-05-03
Payer: COMMERCIAL

## 2023-05-03 VITALS
RESPIRATION RATE: 16 BRPM | BODY MASS INDEX: 28.49 KG/M2 | HEIGHT: 73 IN | SYSTOLIC BLOOD PRESSURE: 145 MMHG | WEIGHT: 215 LBS | DIASTOLIC BLOOD PRESSURE: 89 MMHG | TEMPERATURE: 96.3 F | OXYGEN SATURATION: 99 % | HEART RATE: 74 BPM

## 2023-05-03 DIAGNOSIS — R73.02 GLUCOSE INTOLERANCE (IMPAIRED GLUCOSE TOLERANCE): ICD-10-CM

## 2023-05-03 DIAGNOSIS — I10 ESSENTIAL HYPERTENSION: ICD-10-CM

## 2023-05-03 DIAGNOSIS — E78.5 HYPERLIPIDEMIA, UNSPECIFIED HYPERLIPIDEMIA TYPE: ICD-10-CM

## 2023-05-03 DIAGNOSIS — N52.9 ERECTILE DYSFUNCTION, UNSPECIFIED ERECTILE DYSFUNCTION TYPE: ICD-10-CM

## 2023-05-03 DIAGNOSIS — I10 ESSENTIAL HYPERTENSION WITH GOAL BLOOD PRESSURE LESS THAN 130/80: ICD-10-CM

## 2023-05-03 DIAGNOSIS — Z12.11 SCREENING FOR COLON CANCER: ICD-10-CM

## 2023-05-03 DIAGNOSIS — Z00.00 PREVENTATIVE HEALTH CARE: Primary | ICD-10-CM

## 2023-05-03 PROCEDURE — 99214 OFFICE O/P EST MOD 30 MIN: CPT | Performed by: NURSE PRACTITIONER

## 2023-05-03 PROCEDURE — 3079F DIAST BP 80-89 MM HG: CPT | Performed by: NURSE PRACTITIONER

## 2023-05-03 PROCEDURE — 3077F SYST BP >= 140 MM HG: CPT | Performed by: NURSE PRACTITIONER

## 2023-05-03 RX ORDER — SILDENAFIL 50 MG/1
50 TABLET, FILM COATED ORAL
Qty: 20 TABLET | Refills: 2 | Status: SHIPPED | OUTPATIENT
Start: 2023-05-03

## 2023-05-03 RX ORDER — HYDROCHLOROTHIAZIDE 25 MG/1
25 TABLET ORAL DAILY
Qty: 90 TABLET | Refills: 1 | Status: SHIPPED | OUTPATIENT
Start: 2023-05-03

## 2023-05-04 LAB
ALBUMIN SERPL-MCNC: 4 G/DL (ref 3.5–5)
ALBUMIN/GLOB SERPL: 1.3 (ref 1.1–2.2)
ALP SERPL-CCNC: 58 U/L (ref 45–117)
ALT SERPL-CCNC: 17 U/L (ref 12–78)
ANION GAP SERPL CALC-SCNC: 5 MMOL/L (ref 5–15)
AST SERPL-CCNC: 16 U/L (ref 15–37)
BASOPHILS # BLD: 0 K/UL (ref 0–0.1)
BASOPHILS NFR BLD: 0 % (ref 0–1)
BILIRUB SERPL-MCNC: 1 MG/DL (ref 0.2–1)
BUN SERPL-MCNC: 15 MG/DL (ref 6–20)
BUN/CREAT SERPL: 15 (ref 12–20)
CALCIUM SERPL-MCNC: 9.1 MG/DL (ref 8.5–10.1)
CHLORIDE SERPL-SCNC: 107 MMOL/L (ref 97–108)
CHOLEST SERPL-MCNC: 143 MG/DL
CO2 SERPL-SCNC: 27 MMOL/L (ref 21–32)
CREAT SERPL-MCNC: 1.03 MG/DL (ref 0.7–1.3)
DIFFERENTIAL METHOD BLD: NORMAL
EOSINOPHIL # BLD: 0 K/UL (ref 0–0.4)
EOSINOPHIL NFR BLD: 0 % (ref 0–7)
ERYTHROCYTE [DISTWIDTH] IN BLOOD BY AUTOMATED COUNT: 14.3 % (ref 11.5–14.5)
EST. AVERAGE GLUCOSE BLD GHB EST-MCNC: 117 MG/DL
GLOBULIN SER CALC-MCNC: 3.1 G/DL (ref 2–4)
GLUCOSE SERPL-MCNC: 111 MG/DL (ref 65–100)
HBA1C MFR BLD: 5.7 % (ref 4–5.6)
HCT VFR BLD AUTO: 47.4 % (ref 36.6–50.3)
HDLC SERPL-MCNC: 72 MG/DL
HDLC SERPL: 2 (ref 0–5)
HGB BLD-MCNC: 14.9 G/DL (ref 12.1–17)
IMM GRANULOCYTES # BLD AUTO: 0 K/UL (ref 0–0.04)
IMM GRANULOCYTES NFR BLD AUTO: 0 % (ref 0–0.5)
LDLC SERPL CALC-MCNC: 59.4 MG/DL (ref 0–100)
LYMPHOCYTES # BLD: 1.3 K/UL (ref 0.8–3.5)
LYMPHOCYTES NFR BLD: 28 % (ref 12–49)
MCH RBC QN AUTO: 30.2 PG (ref 26–34)
MCHC RBC AUTO-ENTMCNC: 31.4 G/DL (ref 30–36.5)
MCV RBC AUTO: 96 FL (ref 80–99)
MONOCYTES # BLD: 0.6 K/UL (ref 0–1)
MONOCYTES NFR BLD: 12 % (ref 5–13)
NEUTS SEG # BLD: 2.9 K/UL (ref 1.8–8)
NEUTS SEG NFR BLD: 60 % (ref 32–75)
NRBC # BLD: 0 K/UL (ref 0–0.01)
NRBC BLD-RTO: 0 PER 100 WBC
PLATELET # BLD AUTO: 224 K/UL (ref 150–400)
PMV BLD AUTO: 12.7 FL (ref 8.9–12.9)
POTASSIUM SERPL-SCNC: 3.8 MMOL/L (ref 3.5–5.1)
PROT SERPL-MCNC: 7.1 G/DL (ref 6.4–8.2)
PSA SERPL-MCNC: 1.3 NG/ML (ref 0.01–4)
RBC # BLD AUTO: 4.94 M/UL (ref 4.1–5.7)
SODIUM SERPL-SCNC: 139 MMOL/L (ref 136–145)
TRIGL SERPL-MCNC: 58 MG/DL (ref ?–150)
TSH SERPL DL<=0.05 MIU/L-ACNC: 0.69 UIU/ML (ref 0.36–3.74)
VLDLC SERPL CALC-MCNC: 11.6 MG/DL
WBC # BLD AUTO: 4.9 K/UL (ref 4.1–11.1)

## 2023-08-16 DIAGNOSIS — E78.5 HYPERLIPIDEMIA, UNSPECIFIED: ICD-10-CM

## 2023-08-16 NOTE — TELEPHONE ENCOUNTER
Pt need refill for   AmlodoamLODIPine (NORVASC) 5 MG tablet TAKE 1 TABLET BY MOUTH EVERY DAY  atorvastatin (LIPITOR) 20 MG tablet  TAKE 1 TABLET BY MOUTH EVERY DAY  Send to [facility]

## 2023-08-16 NOTE — TELEPHONE ENCOUNTER
PCP: Pascual White MD    Last appt: [unfilled]  No future appointments.   Medication filled on 4/17/2023 by Tae Tripathi NP  Last seen on 5/3/023 by JAMIN Gardner  Requested Prescriptions     Pending Prescriptions Disp Refills    atorvastatin (LIPITOR) 20 MG tablet [Pharmacy Med Name: ATORVASTATIN 20 MG TABLET] 90 tablet 1     Sig: TAKE 1 TABLET BY MOUTH EVERY DAY       Prior labs and Blood pressures:  BP Readings from Last 3 Encounters:   05/03/23 (!) 145/89   07/25/22 128/79   05/11/22 137/79     Lab Results   Component Value Date/Time     05/03/2023 09:19 AM    K 3.8 05/03/2023 09:19 AM     05/03/2023 09:19 AM    CO2 27 05/03/2023 09:19 AM    BUN 15 05/03/2023 09:19 AM    GFRAA >60 06/14/2021 09:21 AM     No results found for: HBA1C, [id number]  Lab Results   Component Value Date/Time    CHOL 143 05/03/2023 09:19 AM    HDL 72 05/03/2023 09:19 AM    VLDL 16 05/11/2022 11:10 AM     No results found for: VITD3, VD3RIA    Lab Results   Component Value Date/Time    TSH 0.69 05/03/2023 09:19 AM

## 2023-08-17 RX ORDER — ATORVASTATIN CALCIUM 20 MG/1
TABLET, FILM COATED ORAL
Qty: 90 TABLET | Refills: 1 | Status: SHIPPED | OUTPATIENT
Start: 2023-08-17

## 2023-12-13 RX ORDER — AMLODIPINE BESYLATE 5 MG/1
TABLET ORAL
Qty: 30 TABLET | Status: CANCELLED | OUTPATIENT
Start: 2023-12-13

## 2023-12-13 RX ORDER — ENALAPRIL MALEATE 20 MG/1
20 TABLET ORAL 2 TIMES DAILY
Qty: 30 TABLET | Status: CANCELLED | OUTPATIENT
Start: 2023-12-13

## 2023-12-13 NOTE — TELEPHONE ENCOUNTER
PCP: Jim Wilson MD    Last appt: [unfilled]  Future Appointments   Date Time Provider Department Center   12/14/2023  9:40 AM Jim Wilson MD [facility]       Requested Prescriptions      No prescriptions requested or ordered in this encounter       Prior labs and Blood pressures:  BP Readings from Last 3 Encounters:   05/03/23 (!) 145/89   07/25/22 128/79   05/11/22 137/79     Lab Results   Component Value Date/Time     05/03/2023 09:19 AM    K 3.8 05/03/2023 09:19 AM     05/03/2023 09:19 AM    CO2 27 05/03/2023 09:19 AM    BUN 15 05/03/2023 09:19 AM    GFRAA >60 06/14/2021 09:21 AM     No results found for: \"HBA1C\", \"[id number]\"  Lab Results   Component Value Date/Time    CHOL 143 05/03/2023 09:19 AM    HDL 72 05/03/2023 09:19 AM    VLDL 16 05/11/2022 11:10 AM     No results found for: \"VITD3\", \"VD3RIA\"    Lab Results   Component Value Date/Time    TSH 0.69 05/03/2023 09:19 AM

## 2023-12-13 NOTE — TELEPHONE ENCOUNTER
Pt need refill  for following  enalapril (VASOTEC) 20 MG tablet   amLODIPine (NORVASC) 5 MG tablet   Please send Rx to [facility]

## 2023-12-14 ENCOUNTER — TELEPHONE (OUTPATIENT)
Facility: CLINIC | Age: 63
End: 2023-12-14

## 2023-12-14 ENCOUNTER — TELEMEDICINE (OUTPATIENT)
Facility: CLINIC | Age: 63
End: 2023-12-14
Payer: COMMERCIAL

## 2023-12-14 DIAGNOSIS — N52.9 ERECTILE DYSFUNCTION, UNSPECIFIED ERECTILE DYSFUNCTION TYPE: ICD-10-CM

## 2023-12-14 DIAGNOSIS — I25.118 ATHEROSCLEROTIC HEART DISEASE OF NATIVE CORONARY ARTERY WITH OTHER FORMS OF ANGINA PECTORIS (HCC): ICD-10-CM

## 2023-12-14 DIAGNOSIS — E78.2 MIXED HYPERLIPIDEMIA: ICD-10-CM

## 2023-12-14 DIAGNOSIS — I10 ESSENTIAL (PRIMARY) HYPERTENSION: Primary | ICD-10-CM

## 2023-12-14 DIAGNOSIS — Z12.11 SCREENING FOR COLON CANCER: ICD-10-CM

## 2023-12-14 PROCEDURE — 99214 OFFICE O/P EST MOD 30 MIN: CPT | Performed by: FAMILY MEDICINE

## 2023-12-14 RX ORDER — ATORVASTATIN CALCIUM 20 MG/1
20 TABLET, FILM COATED ORAL DAILY
Qty: 90 TABLET | Refills: 1 | Status: SHIPPED | OUTPATIENT
Start: 2023-12-14

## 2023-12-14 RX ORDER — HYDROCHLOROTHIAZIDE 25 MG/1
25 TABLET ORAL DAILY
Qty: 90 TABLET | Refills: 1 | Status: SHIPPED | OUTPATIENT
Start: 2023-12-14

## 2023-12-14 RX ORDER — SILDENAFIL 100 MG/1
100 TABLET, FILM COATED ORAL DAILY PRN
Qty: 30 TABLET | Refills: 2 | Status: SHIPPED | OUTPATIENT
Start: 2023-12-14

## 2023-12-14 RX ORDER — ENALAPRIL MALEATE 20 MG/1
20 TABLET ORAL 2 TIMES DAILY
Qty: 180 TABLET | Refills: 1 | Status: SHIPPED | OUTPATIENT
Start: 2023-12-14

## 2023-12-14 RX ORDER — CARVEDILOL 6.25 MG/1
6.25 TABLET ORAL 2 TIMES DAILY
Qty: 180 TABLET | Refills: 1 | Status: SHIPPED | OUTPATIENT
Start: 2023-12-14

## 2023-12-14 RX ORDER — AMLODIPINE BESYLATE 5 MG/1
TABLET ORAL
Qty: 90 TABLET | Refills: 1 | Status: SHIPPED | OUTPATIENT
Start: 2023-12-14

## 2023-12-14 SDOH — ECONOMIC STABILITY: HOUSING INSECURITY
IN THE LAST 12 MONTHS, WAS THERE A TIME WHEN YOU DID NOT HAVE A STEADY PLACE TO SLEEP OR SLEPT IN A SHELTER (INCLUDING NOW)?: NO

## 2023-12-14 SDOH — ECONOMIC STABILITY: FOOD INSECURITY: WITHIN THE PAST 12 MONTHS, THE FOOD YOU BOUGHT JUST DIDN'T LAST AND YOU DIDN'T HAVE MONEY TO GET MORE.: NEVER TRUE

## 2023-12-14 SDOH — ECONOMIC STABILITY: FOOD INSECURITY: WITHIN THE PAST 12 MONTHS, YOU WORRIED THAT YOUR FOOD WOULD RUN OUT BEFORE YOU GOT MONEY TO BUY MORE.: NEVER TRUE

## 2023-12-14 SDOH — ECONOMIC STABILITY: INCOME INSECURITY: HOW HARD IS IT FOR YOU TO PAY FOR THE VERY BASICS LIKE FOOD, HOUSING, MEDICAL CARE, AND HEATING?: NOT HARD AT ALL

## 2023-12-14 ASSESSMENT — PATIENT HEALTH QUESTIONNAIRE - PHQ9
2. FEELING DOWN, DEPRESSED OR HOPELESS: 0
SUM OF ALL RESPONSES TO PHQ QUESTIONS 1-9: 0
SUM OF ALL RESPONSES TO PHQ QUESTIONS 1-9: 0
SUM OF ALL RESPONSES TO PHQ9 QUESTIONS 1 & 2: 0
1. LITTLE INTEREST OR PLEASURE IN DOING THINGS: 0
SUM OF ALL RESPONSES TO PHQ QUESTIONS 1-9: 0
SUM OF ALL RESPONSES TO PHQ QUESTIONS 1-9: 0

## 2023-12-14 NOTE — PROGRESS NOTES
Angelina Phillips is a 66 y.o. male who was seen by synchronous (real-time) audio-video technology on 12/14/2023. Consent: Angelina Phillips, who was seen by synchronous (real-time) audio-video technology, and/or his healthcare decision maker, is aware that this patient-initiated, Telehealth encounter on 12/14/2023 is a billable service, with coverage as determined by his insurance carrier. He is aware that he may receive a bill and has provided verbal consent to proceed: Yes      Assessment & Plan:   1. Essential (primary) hypertension  BP goals discussed  FU in clinic in person next visit for refill of meds and BP assessment  -     hydroCHLOROthiazide (HYDRODIURIL) 25 MG tablet; Take 1 tablet by mouth daily, Disp-90 tablet, R-1Normal  -     enalapril (VASOTEC) 20 MG tablet; Take 1 tablet by mouth 2 times daily, Disp-180 tablet, R-1Normal  -     carvedilol (COREG) 6.25 MG tablet; Take 1 tablet by mouth 2 times daily, Disp-180 tablet, R-1Normal  -     amLODIPine (NORVASC) 5 MG tablet; TAKE 1 TABLET BY MOUTH EVERY DAY  Indications: high blood pressure, Disp-90 tablet, R-1Normal  2. Hyperlipidemia, unspecified  refilled  -     atorvastatin (LIPITOR) 20 MG tablet; Take 1 tablet by mouth daily, Disp-90 tablet, R-1Normal  3. Atherosclerotic heart disease of native coronary artery with other forms of angina pectoris ([address])  Pain free  Exercises  4. Erectile dysfunction, unspecified erectile dysfunction type  RX to [facility]  -     sildenafil (VIAGRA) 100 MG tablet; Take 1 tablet by mouth daily as needed for Erectile Dysfunction One hour prior to intercourse, Disp-30 tablet, R-2Normal  5. Colon cancer screening-Referral to Dr. Parvez Mendosa for colonoscopy update    Return in about 6 months (around 6/14/2024) for Hypertension, Medication follow up.        Subjective:   Angelina Phillips is a 66 y.o. male who was seen for Medication Refill (Patient here for virtual visit for refills on all medications.)    Needs refill of

## 2023-12-14 NOTE — PROGRESS NOTES
dentified pt with two pt identifiers(name and ). Chief Complaint   Patient presents with    Medication Refill     Patient here for virtual visit for refills on all medications. Health Maintenance Due   Topic    COVID-19 Vaccine (1)    HIV screen     Hepatitis C screen     Colorectal Cancer Screen     Shingles vaccine (1 of 2)    DTaP/Tdap/Td vaccine (3 - Td or Tdap)    Respiratory Syncytial Virus (RSV) age 61 yrs+ (1 - 1-dose 60+ series)    Depression Screen     Flu vaccine (1)       Wt Readings from Last 3 Encounters:   23 97.5 kg (215 lb)   22 101.7 kg (224 lb 3.2 oz)   22 100.6 kg (221 lb 12.8 oz)     Temp Readings from Last 3 Encounters:   No data found for Temp     BP Readings from Last 3 Encounters:   23 (!) 145/89   22 128/79   22 137/79     Pulse Readings from Last 3 Encounters:   23 74   22 70   22 62           Coordination of Care Questionnaire:  :   1. \"Have you been to the ER, urgent care clinic since your last visit? Hospitalized since your last visit? \" no    2. \"Have you seen or consulted any other health care providers outside of the [address] [place] [address] since your last visit? \" no     3. For patients aged 43-73: Has the patient had a colonoscopy / FIT/ Cologuard? no      If the patient is female:    4. For patients aged 43-66: Has the patient had a mammogram within the past 2 years? no      5. For patients aged 21-65: Has the patient had a pap smear? no     3) Do you have an Advance Directive on file? no  Are you interested in receiving information about Advance Directives? no    Patient is accompanied by self I have received verbal consent from Alina Subramanian to discuss any/all medical information while they are present in the room.

## 2024-05-22 DIAGNOSIS — N52.9 MALE ERECTILE DYSFUNCTION, UNSPECIFIED: ICD-10-CM

## 2024-05-24 RX ORDER — SILDENAFIL 50 MG/1
50 TABLET, FILM COATED ORAL DAILY PRN
Qty: 6 TABLET | Refills: 5 | Status: SHIPPED | OUTPATIENT
Start: 2024-05-24

## 2024-05-24 NOTE — TELEPHONE ENCOUNTER
PCP: Jim Wilson MD    Last appt: [unfilled]  No future appointments.    Requested Prescriptions     Pending Prescriptions Disp Refills    sildenafil (VIAGRA) 50 MG tablet [Pharmacy Med Name: SILDENAFIL 50 MG TABLET] 6 tablet 9     Sig: TAKE 1 TABLET BY MOUTH DAILY AS NEEDED FOR ERECTILE DYSFUNCTION.       Prior labs and Blood pressures:  BP Readings from Last 3 Encounters:   05/03/23 (!) 145/89   07/25/22 128/79   05/11/22 137/79     Lab Results   Component Value Date/Time     05/03/2023 09:19 AM    K 3.8 05/03/2023 09:19 AM     05/03/2023 09:19 AM    CO2 27 05/03/2023 09:19 AM    BUN 15 05/03/2023 09:19 AM    GFRAA >60 06/14/2021 09:21 AM     No results found for: \"HBA1C\", \"[id number]\"  Lab Results   Component Value Date/Time    CHOL 143 05/03/2023 09:19 AM    HDL 72 05/03/2023 09:19 AM    LDL 59.4 05/03/2023 09:19 AM    VLDL 11.6 05/03/2023 09:19 AM    VLDL 16 05/11/2022 11:10 AM     No results found for: \"VITD3\"    Lab Results   Component Value Date/Time    TSH 0.69 05/03/2023 09:19 AM

## 2024-06-14 DIAGNOSIS — I10 ESSENTIAL (PRIMARY) HYPERTENSION: ICD-10-CM

## 2024-06-14 RX ORDER — ENALAPRIL MALEATE 20 MG/1
20 TABLET ORAL 2 TIMES DAILY
Qty: 180 TABLET | Refills: 0 | Status: SHIPPED | OUTPATIENT
Start: 2024-06-14

## 2024-06-15 NOTE — TELEPHONE ENCOUNTER
Call patient.  I refilled their medication but they are due to be seen in the office.  Reason:  BP and meds.  Must have in person appointment in clinic for any additional refills.

## 2024-06-17 NOTE — TELEPHONE ENCOUNTER
Patient identified with 2 person identifier. Patient informed that prescription was refilled, and that he has to make an appointment for additional refills. Patient stated he will make an appointment.

## 2024-07-08 DIAGNOSIS — I10 ESSENTIAL (PRIMARY) HYPERTENSION: ICD-10-CM

## 2024-07-09 DIAGNOSIS — I10 ESSENTIAL (PRIMARY) HYPERTENSION: ICD-10-CM

## 2024-07-09 RX ORDER — CARVEDILOL 6.25 MG/1
6.25 TABLET ORAL 2 TIMES DAILY
Qty: 180 TABLET | Refills: 1 | Status: SHIPPED | OUTPATIENT
Start: 2024-07-09

## 2024-07-09 RX ORDER — ENALAPRIL MALEATE 20 MG/1
20 TABLET ORAL 2 TIMES DAILY
Qty: 180 TABLET | Refills: 1 | Status: SHIPPED | OUTPATIENT
Start: 2024-07-09

## 2024-07-09 RX ORDER — AMLODIPINE BESYLATE 5 MG/1
TABLET ORAL
Qty: 90 TABLET | Refills: 1 | Status: SHIPPED | OUTPATIENT
Start: 2024-07-09

## 2024-07-17 ENCOUNTER — OFFICE VISIT (OUTPATIENT)
Facility: CLINIC | Age: 64
End: 2024-07-17
Payer: COMMERCIAL

## 2024-07-17 VITALS
WEIGHT: 221 LBS | BODY MASS INDEX: 29.29 KG/M2 | TEMPERATURE: 97 F | OXYGEN SATURATION: 100 % | HEART RATE: 73 BPM | RESPIRATION RATE: 17 BRPM | DIASTOLIC BLOOD PRESSURE: 76 MMHG | HEIGHT: 73 IN | SYSTOLIC BLOOD PRESSURE: 123 MMHG

## 2024-07-17 DIAGNOSIS — N52.9 ERECTILE DYSFUNCTION, UNSPECIFIED ERECTILE DYSFUNCTION TYPE: ICD-10-CM

## 2024-07-17 DIAGNOSIS — Z12.5 SCREENING FOR MALIGNANT NEOPLASM OF PROSTATE: ICD-10-CM

## 2024-07-17 DIAGNOSIS — R73.02 GLUCOSE INTOLERANCE (IMPAIRED GLUCOSE TOLERANCE): ICD-10-CM

## 2024-07-17 DIAGNOSIS — I25.118 ATHEROSCLEROTIC HEART DISEASE OF NATIVE CORONARY ARTERY WITH OTHER FORMS OF ANGINA PECTORIS (HCC): ICD-10-CM

## 2024-07-17 DIAGNOSIS — E78.2 MIXED HYPERLIPIDEMIA: ICD-10-CM

## 2024-07-17 DIAGNOSIS — I10 ESSENTIAL (PRIMARY) HYPERTENSION: Primary | ICD-10-CM

## 2024-07-17 LAB
ALBUMIN SERPL-MCNC: 4.1 G/DL (ref 3.5–5)
ALBUMIN/GLOB SERPL: 1.2 (ref 1.1–2.2)
ALP SERPL-CCNC: 67 U/L (ref 45–117)
ALT SERPL-CCNC: 21 U/L (ref 12–78)
ANION GAP SERPL CALC-SCNC: 5 MMOL/L (ref 5–15)
AST SERPL-CCNC: 14 U/L (ref 15–37)
BILIRUB DIRECT SERPL-MCNC: 0.3 MG/DL (ref 0–0.2)
BILIRUB SERPL-MCNC: 1.1 MG/DL (ref 0.2–1)
BUN SERPL-MCNC: 14 MG/DL (ref 6–20)
BUN/CREAT SERPL: 15 (ref 12–20)
CALCIUM SERPL-MCNC: 9.5 MG/DL (ref 8.5–10.1)
CHLORIDE SERPL-SCNC: 103 MMOL/L (ref 97–108)
CHOLEST SERPL-MCNC: 159 MG/DL
CO2 SERPL-SCNC: 29 MMOL/L (ref 21–32)
CREAT SERPL-MCNC: 0.96 MG/DL (ref 0.7–1.3)
CREAT UR-MCNC: 99.3 MG/DL
GLOBULIN SER CALC-MCNC: 3.3 G/DL (ref 2–4)
GLUCOSE SERPL-MCNC: 111 MG/DL (ref 65–100)
HBA1C MFR BLD: 5.4 %
HDLC SERPL-MCNC: 71 MG/DL
HDLC SERPL: 2.2 (ref 0–5)
LDLC SERPL CALC-MCNC: 74.6 MG/DL (ref 0–100)
MICROALBUMIN UR-MCNC: 0.74 MG/DL
MICROALBUMIN/CREAT UR-RTO: 7 MG/G (ref 0–30)
POTASSIUM SERPL-SCNC: 4.2 MMOL/L (ref 3.5–5.1)
PROT SERPL-MCNC: 7.4 G/DL (ref 6.4–8.2)
PSA SERPL-MCNC: 1.5 NG/ML (ref 0.01–4)
SODIUM SERPL-SCNC: 137 MMOL/L (ref 136–145)
TRIGL SERPL-MCNC: 67 MG/DL
VLDLC SERPL CALC-MCNC: 13.4 MG/DL

## 2024-07-17 PROCEDURE — 3078F DIAST BP <80 MM HG: CPT | Performed by: FAMILY MEDICINE

## 2024-07-17 PROCEDURE — 3074F SYST BP LT 130 MM HG: CPT | Performed by: FAMILY MEDICINE

## 2024-07-17 PROCEDURE — 99214 OFFICE O/P EST MOD 30 MIN: CPT | Performed by: FAMILY MEDICINE

## 2024-07-17 PROCEDURE — 83036 HEMOGLOBIN GLYCOSYLATED A1C: CPT | Performed by: FAMILY MEDICINE

## 2024-07-17 RX ORDER — ATORVASTATIN CALCIUM 20 MG/1
20 TABLET, FILM COATED ORAL DAILY
Qty: 90 TABLET | Refills: 1 | Status: SHIPPED | OUTPATIENT
Start: 2024-07-17

## 2024-07-17 RX ORDER — HYDROCHLOROTHIAZIDE 25 MG/1
25 TABLET ORAL DAILY
Qty: 90 TABLET | Refills: 1 | Status: SHIPPED | OUTPATIENT
Start: 2024-07-17

## 2024-07-17 RX ORDER — SILDENAFIL 50 MG/1
50 TABLET, FILM COATED ORAL DAILY PRN
Qty: 30 TABLET | Refills: 2 | Status: SHIPPED | OUTPATIENT
Start: 2024-07-17

## 2024-07-17 ASSESSMENT — PATIENT HEALTH QUESTIONNAIRE - PHQ9
1. LITTLE INTEREST OR PLEASURE IN DOING THINGS: NOT AT ALL
SUM OF ALL RESPONSES TO PHQ9 QUESTIONS 1 & 2: 0
SUM OF ALL RESPONSES TO PHQ QUESTIONS 1-9: 0
2. FEELING DOWN, DEPRESSED OR HOPELESS: NOT AT ALL
SUM OF ALL RESPONSES TO PHQ QUESTIONS 1-9: 0

## 2024-07-17 ASSESSMENT — ENCOUNTER SYMPTOMS: SHORTNESS OF BREATH: 0

## 2024-07-17 NOTE — PROGRESS NOTES
Ab Floyd  63 y.o. male  1960  MRN:[id number]  [facility]  Progress Note     Encounter Date: 7/17/2024    Assessment and Plan:       ICD-10-CM    1. Essential (primary) hypertension  I10 Basic Metabolic Panel     Microalbumin / Creatinine Urine Ratio     hydroCHLOROthiazide (HYDRODIURIL) 25 MG tablet      2. Mixed hyperlipidemia  E78.2 Hepatic Function Panel     Lipid Panel     atorvastatin (LIPITOR) 20 MG tablet      3. Atherosclerotic heart disease of native coronary artery with other forms of angina pectoris (HCC)  I25.118       4. Glucose intolerance (impaired glucose tolerance)  R73.02 AMB POC HEMOGLOBIN A1C      5. Male erectile dysfunction, unspecified  N52.9 sildenafil (VIAGRA) 50 MG tablet      6. Screening for malignant neoplasm of prostate  Z12.5 PSA Screening        1. Essential (primary) hypertension  At goal  Continue meds (coreg and enalapril already refilled)  -     Basic Metabolic Panel; Future  -     Microalbumin / Creatinine Urine Ratio; Future  -     hydroCHLOROthiazide (HYDRODIURIL) 25 MG tablet; Take 1 tablet by mouth daily, Disp-90 tablet, R-1Normal  2. Mixed hyperlipidemia  Continue statin  -     Hepatic Function Panel; Future  -     Lipid Panel; Future  -     atorvastatin (LIPITOR) 20 MG tablet; Take 1 tablet by mouth daily, Disp-90 tablet, R-1Normal  3. Atherosclerotic heart disease of native coronary artery with other forms of angina pectoris (HCC)  Free of chest pain.  Working on diet and exercise  Rare tobacco.  4. Glucose intolerance (impaired glucose tolerance)  -     AMB POC HEMOGLOBIN A1C 5.4  5. Male erectile dysfunction, unspecified  Refilled.  -     sildenafil (VIAGRA) 50 MG tablet; Take 1 tablet by mouth daily as needed for Erectile Dysfunction for erectile dysfunction, Disp-30 tablet, R-2Print  6. Screening PSA       I have discussed the diagnosis with the patient and the intended plan as seen in the above orders.  he has expressed

## 2024-07-17 NOTE — PROGRESS NOTES
Identified patient with two patient identifiers (name and ). Reviewed chart in preparation for visit and have obtained necessary documentation.    Ab Floyd is a 63 y.o. male  Chief Complaint   Patient presents with    Medication Refill     /76 (Site: Left Upper Arm, Position: Sitting, Cuff Size: Large Adult)   Pulse 73   Temp 97 °F (36.1 °C) (Oral)   Resp 17   Ht 1.854 m (6' 1\")   Wt 100.2 kg (221 lb)   SpO2 100%   BMI 29.16 kg/m²     1. Have you been to the ER, urgent care clinic since your last visit?  Hospitalized since your last visit?no    2. Have you seen or consulted any other health care providers outside of the [facility] since your last visit?  Include any pap smears or colon screening. no

## 2024-12-06 DIAGNOSIS — I10 ESSENTIAL (PRIMARY) HYPERTENSION: ICD-10-CM

## 2024-12-06 RX ORDER — CARVEDILOL 6.25 MG/1
6.25 TABLET ORAL 2 TIMES DAILY
Qty: 180 TABLET | Refills: 1 | Status: SHIPPED | OUTPATIENT
Start: 2024-12-06

## 2024-12-06 RX ORDER — AMLODIPINE BESYLATE 5 MG/1
TABLET ORAL
Qty: 90 TABLET | Refills: 1 | Status: SHIPPED | OUTPATIENT
Start: 2024-12-06

## 2025-01-18 DIAGNOSIS — E78.2 MIXED HYPERLIPIDEMIA: ICD-10-CM

## 2025-01-20 RX ORDER — ATORVASTATIN CALCIUM 20 MG/1
20 TABLET, FILM COATED ORAL DAILY
Qty: 90 TABLET | Refills: 1 | Status: SHIPPED | OUTPATIENT
Start: 2025-01-20

## 2025-03-14 DIAGNOSIS — I10 ESSENTIAL (PRIMARY) HYPERTENSION: ICD-10-CM

## 2025-03-14 RX ORDER — ENALAPRIL MALEATE 20 MG/1
20 TABLET ORAL 2 TIMES DAILY
Qty: 180 TABLET | Refills: 0 | Status: SHIPPED | OUTPATIENT
Start: 2025-03-14

## 2025-03-15 NOTE — TELEPHONE ENCOUNTER
Call patient.  I refilled their medication but they are due to be seen in the office.  Reason:  BP and medication follow up.

## 2025-06-11 DIAGNOSIS — I10 ESSENTIAL (PRIMARY) HYPERTENSION: ICD-10-CM

## 2025-06-11 RX ORDER — AMLODIPINE BESYLATE 5 MG/1
TABLET ORAL
Qty: 90 TABLET | Refills: 0 | OUTPATIENT
Start: 2025-06-11

## 2025-06-11 RX ORDER — CARVEDILOL 6.25 MG/1
6.25 TABLET ORAL 2 TIMES DAILY
Qty: 180 TABLET | Refills: 0 | OUTPATIENT
Start: 2025-06-11

## 2025-06-11 RX ORDER — ENALAPRIL MALEATE 20 MG/1
20 TABLET ORAL 2 TIMES DAILY
Qty: 180 TABLET | Refills: 0 | OUTPATIENT
Start: 2025-06-11